# Patient Record
Sex: FEMALE | Race: WHITE
[De-identification: names, ages, dates, MRNs, and addresses within clinical notes are randomized per-mention and may not be internally consistent; named-entity substitution may affect disease eponyms.]

---

## 2020-01-24 ENCOUNTER — HOSPITAL ENCOUNTER (OUTPATIENT)
Dept: HOSPITAL 53 - M SFHCCLAY | Age: 75
End: 2020-01-24
Attending: NURSE PRACTITIONER
Payer: MEDICARE

## 2020-01-24 DIAGNOSIS — Z13.21: ICD-10-CM

## 2020-01-24 DIAGNOSIS — E11.69: Primary | ICD-10-CM

## 2020-01-24 DIAGNOSIS — Z79.899: ICD-10-CM

## 2020-01-24 LAB
25(OH)D3 SERPL-MCNC: 27 NG/ML (ref 30–100)
ALBUMIN SERPL BCG-MCNC: 4.3 GM/DL (ref 3.2–5.2)
ALT SERPL W P-5'-P-CCNC: 21 U/L (ref 12–78)
ANISOCYTOSIS BLD QL SMEAR: (no result)
BASOPHILS NFR BLD MANUAL: 1 % (ref 0–1)
BILIRUB SERPL-MCNC: 0.6 MG/DL (ref 0.2–1)
BUN SERPL-MCNC: 16 MG/DL (ref 7–18)
CALCIUM SERPL-MCNC: 9.4 MG/DL (ref 8.8–10.2)
CHLORIDE SERPL-SCNC: 105 MEQ/L (ref 98–107)
CHOLEST SERPL-MCNC: 230 MG/DL (ref ?–200)
CHOLEST/HDLC SERPL: 4.34 {RATIO} (ref ?–5)
CO2 SERPL-SCNC: 32 MEQ/L (ref 21–32)
CREAT SERPL-MCNC: 0.88 MG/DL (ref 0.55–1.3)
CREAT UR-MCNC: 43.2 MG/DL
EOSINOPHIL NFR BLD MANUAL: 1 % (ref 0–3)
EST. AVERAGE GLUCOSE BLD GHB EST-MCNC: 157 MG/DL (ref 60–110)
GFR SERPL CREATININE-BSD FRML MDRD: > 60 ML/MIN/{1.73_M2} (ref 39–?)
GLUCOSE SERPL-MCNC: 117 MG/DL (ref 70–100)
HCT VFR BLD AUTO: 44.8 % (ref 36–47)
HDLC SERPL-MCNC: 53 MG/DL (ref 40–?)
HGB BLD-MCNC: 14 G/DL (ref 12–15.5)
HYPOCHROMIA BLD QL SMEAR: (no result)
LDLC SERPL CALC-MCNC: 150 MG/DL (ref ?–100)
LYMPHOCYTES NFR BLD MANUAL: 42 % (ref 16–44)
MCH RBC QN AUTO: 29.9 PG (ref 27–33)
MCHC RBC AUTO-ENTMCNC: 31.3 G/DL (ref 32–36.5)
MCV RBC AUTO: 95.7 FL (ref 80–96)
MICROALBUMIN UR-MCNC: 196 MG/L
MICROALBUMIN/CREAT UR: 453.7 MCG/MG (ref 0–30)
MONOCYTES NFR BLD MANUAL: 4 % (ref 0–5)
NEUTROPHILS NFR BLD MANUAL: 44 % (ref 28–66)
NONHDLC SERPL-MCNC: 177 MG/DL
PLATELET # BLD AUTO: 224 10^3/UL (ref 150–450)
PLATELET BLD QL SMEAR: NORMAL
POTASSIUM SERPL-SCNC: 4 MEQ/L (ref 3.5–5.1)
PROT SERPL-MCNC: 7.6 GM/DL (ref 6.4–8.2)
RBC # BLD AUTO: 4.68 10^6/UL (ref 4–5.4)
SODIUM SERPL-SCNC: 142 MEQ/L (ref 136–145)
TRIGL SERPL-MCNC: 136 MG/DL (ref ?–150)
VARIANT LYMPHS NFR BLD MANUAL: 8 % (ref 0–5)
WBC # BLD AUTO: 9.9 10^3/UL (ref 4–10)

## 2020-01-24 PROCEDURE — 83036 HEMOGLOBIN GLYCOSYLATED A1C: CPT

## 2020-01-24 PROCEDURE — 82043 UR ALBUMIN QUANTITATIVE: CPT

## 2020-01-24 PROCEDURE — 82306 VITAMIN D 25 HYDROXY: CPT

## 2020-01-24 PROCEDURE — 80061 LIPID PANEL: CPT

## 2020-01-24 PROCEDURE — 85025 COMPLETE CBC W/AUTO DIFF WBC: CPT

## 2020-01-24 PROCEDURE — 80053 COMPREHEN METABOLIC PANEL: CPT

## 2020-05-15 ENCOUNTER — HOSPITAL ENCOUNTER (OUTPATIENT)
Dept: HOSPITAL 53 - M SFHCCLAY | Age: 75
End: 2020-05-15
Attending: NURSE PRACTITIONER
Payer: MEDICARE

## 2020-05-15 DIAGNOSIS — R41.0: Primary | ICD-10-CM

## 2020-06-14 ENCOUNTER — HOSPITAL ENCOUNTER (INPATIENT)
Dept: HOSPITAL 53 - M ED | Age: 75
LOS: 4 days | Discharge: HOME | DRG: 380 | End: 2020-06-18
Attending: INTERNAL MEDICINE | Admitting: INTERNAL MEDICINE
Payer: MEDICARE

## 2020-06-14 VITALS — WEIGHT: 179.24 LBS | HEIGHT: 66 IN | BODY MASS INDEX: 28.81 KG/M2

## 2020-06-14 VITALS — DIASTOLIC BLOOD PRESSURE: 49 MMHG | SYSTOLIC BLOOD PRESSURE: 102 MMHG

## 2020-06-14 VITALS — DIASTOLIC BLOOD PRESSURE: 47 MMHG | SYSTOLIC BLOOD PRESSURE: 95 MMHG

## 2020-06-14 VITALS — DIASTOLIC BLOOD PRESSURE: 50 MMHG | SYSTOLIC BLOOD PRESSURE: 95 MMHG

## 2020-06-14 VITALS — DIASTOLIC BLOOD PRESSURE: 43 MMHG | SYSTOLIC BLOOD PRESSURE: 87 MMHG

## 2020-06-14 DIAGNOSIS — Z79.899: ICD-10-CM

## 2020-06-14 DIAGNOSIS — E11.40: ICD-10-CM

## 2020-06-14 DIAGNOSIS — I48.91: ICD-10-CM

## 2020-06-14 DIAGNOSIS — I24.8: ICD-10-CM

## 2020-06-14 DIAGNOSIS — N17.9: ICD-10-CM

## 2020-06-14 DIAGNOSIS — K26.5: Primary | ICD-10-CM

## 2020-06-14 DIAGNOSIS — R57.0: ICD-10-CM

## 2020-06-14 DIAGNOSIS — F03.90: ICD-10-CM

## 2020-06-14 DIAGNOSIS — Z87.891: ICD-10-CM

## 2020-06-14 LAB
ALBUMIN SERPL BCG-MCNC: 3.9 GM/DL (ref 3.2–5.2)
ALT SERPL W P-5'-P-CCNC: 20 U/L (ref 12–78)
BASOPHILS # BLD AUTO: 0.1 10^3/UL (ref 0–0.2)
BASOPHILS NFR BLD AUTO: 0.3 % (ref 0–1)
BILIRUB CONJ SERPL-MCNC: 0.1 MG/DL (ref 0–0.2)
BILIRUB SERPL-MCNC: 0.4 MG/DL (ref 0.2–1)
CK MB CFR.DF SERPL CALC: 1.15
CK MB CFR.DF SERPL CALC: 1.59
CK MB SERPL-MCNC: 1.5 NG/ML (ref ?–3.6)
CK MB SERPL-MCNC: 3.3 NG/ML (ref ?–3.6)
CK SERPL-CCNC: 131 U/L (ref 26–192)
CK SERPL-CCNC: 208 U/L (ref 26–192)
EOSINOPHIL # BLD AUTO: 0.2 10^3/UL (ref 0–0.5)
EOSINOPHIL NFR BLD AUTO: 1 % (ref 0–3)
HCT VFR BLD AUTO: 39.9 % (ref 36–47)
HGB BLD-MCNC: 13.4 G/DL (ref 12–15.5)
LIPASE SERPL-CCNC: 155 U/L (ref 73–393)
LYMPHOCYTES # BLD AUTO: 4.6 10^3/UL (ref 1.5–5)
LYMPHOCYTES NFR BLD AUTO: 29.8 % (ref 24–44)
MCH RBC QN AUTO: 31.2 PG (ref 27–33)
MCHC RBC AUTO-ENTMCNC: 33.6 G/DL (ref 32–36.5)
MCV RBC AUTO: 92.8 FL (ref 80–96)
MONOCYTES # BLD AUTO: 0.9 10^3/UL (ref 0–0.8)
MONOCYTES NFR BLD AUTO: 5.9 % (ref 0–5)
NEUTROPHILS # BLD AUTO: 9.7 10^3/UL (ref 1.5–8.5)
NEUTROPHILS NFR BLD AUTO: 62.5 % (ref 36–66)
PLATELET # BLD AUTO: 193 10^3/UL (ref 150–450)
PROT SERPL-MCNC: 7.3 GM/DL (ref 6.4–8.2)
RBC # BLD AUTO: 4.3 10^6/UL (ref 4–5.4)
TROPONIN I SERPL-MCNC: 0.08 NG/ML (ref ?–0.1)
TROPONIN I SERPL-MCNC: 0.26 NG/ML (ref ?–0.1)
TROPONIN I SERPL-MCNC: < 0.02 NG/ML (ref ?–0.1)
WBC # BLD AUTO: 15.6 10^3/UL (ref 4–10)

## 2020-06-14 RX ADMIN — SODIUM CHLORIDE ONE MLS/HR: 9 INJECTION, SOLUTION INTRAVENOUS at 21:04

## 2020-06-14 RX ADMIN — SODIUM CHLORIDE ONE MLS/HR: 9 INJECTION, SOLUTION INTRAVENOUS at 21:03

## 2020-06-14 NOTE — REP
Four views abdomen and chest:  06/14/2020.

 

Indication:  Abdominal pain.

 

Findings:

 

The lungs are clear.  There is no free intraperitoneal air.  Contrast is noted

within the urinary bladder.  Multilevel degenerative sequelae of the

thoracolumbar spine are present.  There is no evidence of bowel obstruction.  No

organomegaly is detected.

 

Impression:

 

No bowel obstruction or additional acute process detected.

 

 

Electronically Signed by

Mingo Fischer DO 06/14/2020 03:10 P

## 2020-06-14 NOTE — HPEPDOC
[facility] Medical History & Physical


Date of Admission


2020


Date of Service:  2020


Primary Care Physician:  Sharona Kathleen


Attending Physician:  YESIKA MARCOS MD





History and Physical


TIME OF SERVICE: 9:50 PM


   


CHIEF COMPLAINT: Abdominal pain





HISTORY OF PRESENT ILLNESS: 


This patient is hard of hearing and has dementia, therefore, the history was 

limited.


This is a 75-year-old female who presented with complaints of lower abdominal 

pain that began yesterday and has now resolved. She also had multiple episodes 

of vomiting. Her last bowel movement was 4 days ago. She provided tangential 

answers when asked about fever, chills or any urinary symptoms.  


Per discussion with Fina Wright, the patient was febrile and found to have 

new onset atrial fibrillation on the second EKG. She contacted Dr. Joyce, who 

recommended giving 1 dose of amiodarone along with digoxin which the patient 

received in the ER. He also recommended continuing with digoxin daily; if she 

had an additional episode of rapid afib with a HR above 100 she can receive an 

extra dose of digoxin 0.25mg. He also recommended starting the patient on 

Elquis.





REVIEW OF SYSTEMS: Unable to complete because the patient had difficulties 

hearing me





PAST MEDICAL/ SURGICAL HISTORY:


Chronic hypertension


Diabetes with neuropathy


Dementia


Impaired hearing


Cataract surgery


Nephrolithiasis


 4


Bilateral cataract surgery





SOCIAL HISTORY:


Former smoker





FAMILY HISTORY:


Diabetes.


Colon cancer


Stroke


   


ALLERGIES: Please see below.





HOME MEDICATIONS: Please see below. 





PHYSICAL EXAMINATION:


VITAL SIGNS:  Please see below.


GEN: well-nourished / well developed/ NAD


HEENT: NCAT / mucus membranes moist and pink 


CVS: RRR/NMRG/ radial pulses intact / trace lower extremity edema


LUNGS:  able to speak full sentences without stopping to take a breath /  lungs 

are clear to auscultation bilaterally on room air


ABDOMEN:  Contour ( distended) /  soft & not tender with palpation


MSK/EXTREMITIES: range of motion intact in all 4 extremities


PSYCH: alert and oriented / able to understand and follow all commands





LABORATORY DATA: 








Immature Granulocyte % (Auto) 0.5, Neutrophils (%) (Auto) 62.5, Lymphocytes (%) 

(Auto) 29.8, Monocytes (%) (Auto) 5.9H, Eosinophils (%) (Auto) 1.0, Basophils 

(%) (Auto) 0.3, Neutrophils # (Auto) 9.7H, Lymphocytes # (Auto) 4.6, Monocytes #

(Auto) 0.9H, Eosinophils # (Auto) 0.2, Basophils # (Auto) 0.1, Nucleated Red 

Blood Cells % (auto) 0.0, Total Bilirubin 0.4, Direct Bilirubin 0.1, Aspartate 

Amino Transf (AST/SGOT) 19, Alanine Aminotransferase (ALT/SGPT) 20, Alkaline 

Phosphatase 78, Total Creatine Kinase 208H, Creatine Kinase MB 3.3, Creatine 

Kinase MB Relative Index 1.59, Troponin I < 0.02, Total Protein 7.3, Albumin 

3.9, Albumin/Globulin Ratio 1.1L, Lipase 155


20 12:33: 


POC Glucose (Misc Panel) 130H, POC Sodium (Misc Panel) 144, POC Potassium (Misc 

Panel) 4.5, POC Chloride (Misc Panel) 105, POC Total CO2 (Misc Panel) 25.0, POC 

Blood Urea Nitrogen (Misc Panel 28H, POC Ionized Calcium (Misc Panel) 4.5, POC 

Creatinine (Misc Panel) 0.9, POC Hematocrit (Misc Panel) 41.0


20 13:31: Lactic Acid Level 1.5


20 14:44: 


Urine Color STRAW, Urine Appearance CLEAR, Urine pH 6.0, Urine Specific Gravity 

1.031, Urine Protein NEGATIVE, Urine Glucose (UA) NEGATIVE, Urine Ketones 

NEGATIVE, Urine Blood 1+H, Urine Nitrite NEGATIVE, Urine Bilirubin NEGATIVE, 

Urine Urobilinogen 0.2, Urine Leukocyte Esterase NEGATIVE, Urine WBC (Auto) 1, 

Urine RBC (Auto) 0, Urine Hyaline Casts (Auto) 0, Urine Bacteria (Auto) 

NEGATIVE, Urine Squamous Epithelial Cells 0, Urine Sperm (Auto) 


20 20:11: 


Total Creatine Kinase 131, Creatine Kinase MB 1.5, Creatine Kinase MB Relative 

Index 1.15, Troponin I 0.08#


20 20:12: Lactic Acid Level 1.4


20 22:17: Troponin I 0.26#H





IMAGING: 


CTA chest " Impression: No dissection or vessel occlusion.  No intraluminal 

clot."


CT abdomen and pelvis " Impression: No dissection or vessel occlusion.  No 

intraluminal clot."


X-ray of the abdomen "Impression: No bowel obstruction or additional acute 

process detected."





MICROBIOLOGY: 


20 Respiratory Virus Panel (PCR) (SHANIA) - Final, Complete


          


20 Blood Culture, Received


          Pending


20 Blood Culture, Received


          Pending





ASSESSMENT:


Ms. Bearden is a 75-year-old female with hypertension, diabetes, dementia, dif

ficulties hearing is admitted for evaluation of SIRS and new onset A. fib.





PLAN:


1. New onset Atrial Fibrillation 


Cause TBD


CHADS VASC Score to determine risk of stroke = 5 points 


Plan:  admit to ICU / telemetry / c/w digoxin 1.25mg PO daily / Eliquis / f/u 

serial trops, TSH, Mag & Echo 





2. Elevated Trop


Possibly due to demand ischemia / tachycardia vs NSTEMI


The first EKG showed normal sinus rhythm with a heart rate of 67, the second EKG

showed A. fib with a heart rate of 120, the 30 EKG showed normal sinus rhythm 

with a heart rate of 82 and supraventricular complexes


Plan: telemetry f/u serial trops, serial EKGs, lipid panel, A1C 





3. Hypotension 


She has a hx of HTN 


Plan: IVF / hold lisinopril





4. SIRS 


Possibly due to occult infection


SIRS criteria include


Temp >101 8 / HR >90 / WBC >12 /  RR >20


Lactic acid wnl


Respiratory panel, Chest x-ray, CT of the abdomen and UA are unrevealing


Plan:   telemetry / acetaminophen / f/u Blood Cx 





5.Lower Abdominal Pain 


Cause TBD


Has resolved. 


CT of the abdomen, UA, LFTs and lipase were unrevealing


Plan:  miralax for constipation


6. NIDDM 


Plan:  diabetic diet / f/u accuchecks & A1C / hypoglycemia protocol / sliding 

scale insulin / hold oral anti-glycemics





DVT PROPHYLAXIS: n/a on Eliquis





DISPOSITION: likely home after more than 2 midnight's stay





Home Medications


Scheduled


Lisinopril (Lisinopril) 10 Mg Tablet, 10 MG PO DAILY


Metformin HCl (Metformin HCl) 500 Mg Tablet, 500 MG PO DAILY





Allergies


Coded Allergies:  


     No Known Allergies (Verified  Allergy, Unknown, 20)











YESIKA MARCOS MD                2020 23:45

## 2020-06-14 NOTE — ECGEPIP
[facility] - ED

                                       

                                       Test Date:    2020

Pat Name:     LISBETH BUCHANAN               Department:   

Patient ID:   [id number]                 Room:         -

Gender:       Female                   Technician:   

:          1945               Requested By: JESUS OLEA

Order Number: [id number]     Reading MD:   Katherine Bowser

                                 Measurements

Intervals                              Axis          

Rate:         67                       P:            73

AK:           167                      QRS:          -40

QRSD:         94                       T:            34

QT:           374                                    

QTc:          397                                    

                           Interpretive Statements

SINUS RHYTHM WITH MARKED SINUS ARRHYTHMIA

MARKED LEFT AXIS DEVIATION

SEPTAL MYOCARDIAL INFARCTION, OF INDETERMINATE AGE

NSTTW abnormalities

NO PRIOR

Electronically Signed on 2020 16:12:34 EDT by Katherine Bowser

## 2020-06-14 NOTE — REP
CTA chest, abdomen and pelvis:

 

Indication:  Chest, abdomen and pelvic pain radiating cephalad.

 

Technique:  Axial images of the chest, abdomen and pelvis were performed

following administration of IV iodinated contrast with coronal and sagittal

reconstructions provided.

 

Comparison:  None.

 

Findings:

 

There is no evidence of vessel dissection, significant stenosis or occlusion.

Atherosclerotic disease is noted throughout the aorta and iliac vessels as well

as the renal arteries.  No abnormal fluid collections are present.  There is no

evidence of lymphadenopathy.  The lungs are clear.  There is no pneumothorax.

There is no bowel obstruction.  No focal abnormalities of the spleen, liver,

gallbladder, pancreas or kidneys are noted.

 

Impression:

 

No dissection or vessel occlusion.  No intraluminal clot.

 

 

Electronically Signed by

Mingo Fischer DO 06/14/2020 01:37 P

## 2020-06-15 VITALS — SYSTOLIC BLOOD PRESSURE: 94 MMHG | DIASTOLIC BLOOD PRESSURE: 50 MMHG

## 2020-06-15 VITALS — DIASTOLIC BLOOD PRESSURE: 70 MMHG | SYSTOLIC BLOOD PRESSURE: 155 MMHG

## 2020-06-15 VITALS — SYSTOLIC BLOOD PRESSURE: 108 MMHG | DIASTOLIC BLOOD PRESSURE: 55 MMHG

## 2020-06-15 VITALS — DIASTOLIC BLOOD PRESSURE: 46 MMHG | SYSTOLIC BLOOD PRESSURE: 92 MMHG

## 2020-06-15 VITALS — DIASTOLIC BLOOD PRESSURE: 60 MMHG | SYSTOLIC BLOOD PRESSURE: 99 MMHG

## 2020-06-15 VITALS — SYSTOLIC BLOOD PRESSURE: 96 MMHG | DIASTOLIC BLOOD PRESSURE: 52 MMHG

## 2020-06-15 VITALS — SYSTOLIC BLOOD PRESSURE: 143 MMHG | DIASTOLIC BLOOD PRESSURE: 59 MMHG

## 2020-06-15 VITALS — DIASTOLIC BLOOD PRESSURE: 52 MMHG | SYSTOLIC BLOOD PRESSURE: 100 MMHG

## 2020-06-15 VITALS — SYSTOLIC BLOOD PRESSURE: 103 MMHG | DIASTOLIC BLOOD PRESSURE: 52 MMHG

## 2020-06-15 VITALS — DIASTOLIC BLOOD PRESSURE: 53 MMHG | SYSTOLIC BLOOD PRESSURE: 113 MMHG

## 2020-06-15 VITALS — SYSTOLIC BLOOD PRESSURE: 152 MMHG | DIASTOLIC BLOOD PRESSURE: 68 MMHG

## 2020-06-15 VITALS — DIASTOLIC BLOOD PRESSURE: 57 MMHG | SYSTOLIC BLOOD PRESSURE: 122 MMHG

## 2020-06-15 VITALS — SYSTOLIC BLOOD PRESSURE: 95 MMHG | DIASTOLIC BLOOD PRESSURE: 51 MMHG

## 2020-06-15 VITALS — SYSTOLIC BLOOD PRESSURE: 111 MMHG | DIASTOLIC BLOOD PRESSURE: 51 MMHG

## 2020-06-15 VITALS — SYSTOLIC BLOOD PRESSURE: 101 MMHG | DIASTOLIC BLOOD PRESSURE: 52 MMHG

## 2020-06-15 LAB
ALBUMIN SERPL BCG-MCNC: 3 GM/DL (ref 3.2–5.2)
ALT SERPL W P-5'-P-CCNC: 18 U/L (ref 12–78)
BILIRUB CONJ SERPL-MCNC: 0.2 MG/DL (ref 0–0.2)
BILIRUB SERPL-MCNC: 0.6 MG/DL (ref 0.2–1)
BUN SERPL-MCNC: 32 MG/DL (ref 7–18)
CALCIUM SERPL-MCNC: 7.9 MG/DL (ref 8.8–10.2)
CHLORIDE SERPL-SCNC: 111 MEQ/L (ref 98–107)
CHOLEST SERPL-MCNC: 192 MG/DL (ref ?–200)
CHOLEST/HDLC SERPL: 3.25 {RATIO} (ref ?–5)
CO2 SERPL-SCNC: 26 MEQ/L (ref 21–32)
CREAT SERPL-MCNC: 1.35 MG/DL (ref 0.55–1.3)
EST. AVERAGE GLUCOSE BLD GHB EST-MCNC: 143 MG/DL (ref 60–110)
GFR SERPL CREATININE-BSD FRML MDRD: 40.5 ML/MIN/{1.73_M2} (ref 39–?)
GLUCOSE SERPL-MCNC: 140 MG/DL (ref 70–100)
HCT VFR BLD AUTO: 34.7 % (ref 36–47)
HDLC SERPL-MCNC: 59 MG/DL (ref 40–?)
HGB BLD-MCNC: 11.5 G/DL (ref 12–15.5)
LDLC SERPL CALC-MCNC: 124 MG/DL (ref ?–100)
MAGNESIUM SERPL-MCNC: 2.3 MG/DL (ref 1.8–2.4)
MAGNESIUM SERPL-MCNC: 2.4 MG/DL (ref 1.8–2.4)
MCH RBC QN AUTO: 31.1 PG (ref 27–33)
MCHC RBC AUTO-ENTMCNC: 33.1 G/DL (ref 32–36.5)
MCV RBC AUTO: 93.8 FL (ref 80–96)
NONHDLC SERPL-MCNC: 133 MG/DL
PLATELET # BLD AUTO: 147 10^3/UL (ref 150–450)
POTASSIUM SERPL-SCNC: 4.1 MEQ/L (ref 3.5–5.1)
POTASSIUM SERPL-SCNC: 4.3 MEQ/L (ref 3.5–5.1)
PROT SERPL-MCNC: 6.3 GM/DL (ref 6.4–8.2)
RBC # BLD AUTO: 3.7 10^6/UL (ref 4–5.4)
SODIUM SERPL-SCNC: 145 MEQ/L (ref 136–145)
TRIGL SERPL-MCNC: 47 MG/DL (ref ?–150)
TROPONIN I SERPL-MCNC: 1.1 NG/ML (ref ?–0.1)
TSH SERPL DL<=0.005 MIU/L-ACNC: 0.68 UIU/ML (ref 0.36–3.74)
WBC # BLD AUTO: 22.1 10^3/UL (ref 4–10)

## 2020-06-15 RX ADMIN — ACETAMINOPHEN PRN MG: 325 TABLET ORAL at 17:55

## 2020-06-15 RX ADMIN — POLYETHYLENE GLYCOL 3350 SCH PKT: 17 POWDER, FOR SOLUTION ORAL at 08:00

## 2020-06-15 RX ADMIN — INSULIN LISPRO SCH UNITS: 100 INJECTION, SOLUTION INTRAVENOUS; SUBCUTANEOUS at 17:53

## 2020-06-15 RX ADMIN — DIGOXIN SCH MG: 125 TABLET ORAL at 08:00

## 2020-06-15 RX ADMIN — INSULIN LISPRO SCH UNITS: 100 INJECTION, SOLUTION INTRAVENOUS; SUBCUTANEOUS at 11:39

## 2020-06-15 RX ADMIN — INSULIN LISPRO SCH UNITS: 100 INJECTION, SOLUTION INTRAVENOUS; SUBCUTANEOUS at 08:00

## 2020-06-15 RX ADMIN — POLYETHYLENE GLYCOL 3350 SCH PKT: 17 POWDER, FOR SOLUTION ORAL at 20:42

## 2020-06-15 RX ADMIN — ACETAMINOPHEN PRN MG: 325 TABLET ORAL at 13:15

## 2020-06-15 RX ADMIN — APIXABAN SCH MG: 5 TABLET, FILM COATED ORAL at 08:00

## 2020-06-15 RX ADMIN — MORPHINE SULFATE PRN MG: 2 INJECTION, SOLUTION INTRAMUSCULAR; INTRAVENOUS at 17:54

## 2020-06-15 RX ADMIN — INSULIN LISPRO SCH UNITS: 100 INJECTION, SOLUTION INTRAVENOUS; SUBCUTANEOUS at 20:41

## 2020-06-15 RX ADMIN — APIXABAN SCH MG: 5 TABLET, FILM COATED ORAL at 20:41

## 2020-06-15 NOTE — ECGEPIP
[facility] - ED

                                       

                                       Test Date:    2020

Pat Name:     LISBETH BUCHANAN               Department:   

Patient ID:   [id number]                 Room:         [place] [address]

Gender:       Female                   Technician:   kevan

:          1945               Requested By: KENNETH AMADO PA-C

Order Number: [id number]     Reading MD:   Torin Dyer

                                 Measurements

Intervals                              Axis          

Rate:         82                       P:            79

GA:           160                      QRS:          -34

QRSD:         94                       T:            159

QT:           356                                    

QTc:          418                                    

                           Interpretive Statements

SINUS RHYTHM WITH OCCASIONAL SUPRAVENTRICULAR PREMATURE COMPLEXES

LEFT AXIS DEVIATION

POOR R WAVE PROGRESSION

ST DEVIATION AND MODERATE T-WAVE ABNORMALITY, CONSIDER LATERAL ISCHEMIA

SIMILAR TO PRIOR ON SAME DATE

Electronically Signed on 6- 21:27:30 EDT by Torin Dyer

## 2020-06-15 NOTE — ECHO
DATE OF PROCEDURE:  06/15/2020

 

REFERRING PHYSICIAN:  Dr. Audrey Navarro

 

INDICATION:  Dyspnea.

 

Height 168 cm, weight 79 kg.

 

DIMENSIONS:

IVS:  1.4

LV:  3.7

LVPW:  1.3

LA:  3.5

Aorta:  3.1

IVC:  1.6

Mitral E wave velocity:  83

A wave:  87

E prime septal:  4.1

E prime lateral:  5.7

 

FINDINGS:

The study is of rather limited quality.  It was a sitting exam with 
uncooperative

patient. The patient is in sinus rhythm.

 

Left ventricle has normal size and normal systolic function, I estimate ejection

fraction (EF) around 65-70%.  Mild left ventricular hypertrophy is present.

Right ventricle is normal size and systolic function.  Both atria appear at 
least

mildly enlarged.  Aortic valve is sclerotic.  There appears some minimal

restriction of cusp mobility, but visualization was poor.  There are also

degenerative abnormalities of mitral valve with mitral annular calcifications 
but

mobility of leaflet seems preserved.  Tricuspid valve appears normal.  Pulmonic

valve also appears normal.  No pericardial effusion is noted.  Inferior vena 
cava

is normal size and appropriately collapses with inspiration indicative of likely

normal central venous pressure.  Aortic root is normal.  Aortic arch and

abdominal aorta were not well seen.

 

Doppler interrogation of aortic valve reveals no significant insufficiency and

fairly trivial stenosis with mean gradient 6 mmHg.  There is competent mitral

valve without stenosis or insufficiency, trace tricuspid insufficiency and trace

pulmonic insufficiency.  Calculated pulmonary artery pressure is in 20s

corresponding to normal values.

 

Mitral inflow pattern and tissue Doppler imaging of mitral annulus revealed 
grade

1 diastolic dysfunction.

 

CONCLUSIONS:

1.  Study is of fair technical quality, the patient is in sinus rhythm.

2.  Normal left ventricular (LV) size with mild left ventricular hypertrophy

(LVH),  preserved LV systolic function and grade 1 diastolic dysfunction.

3.  Aortic sclerosis but without significant insufficiency and only trivial

stenosis.

4.  Degenerative abnormalities of mitral valve but without functional

consequence.

5.  Trace tricuspid and pulmonic insufficiency.

6.  Likely normal central venous pressure and normal pulmonary artery pressure.

 

COMMENT:  Subacute bacterial endocarditis (SBE) prophylaxis is not recommended.

The study is most consistent with mild form of hypertensive heart disease but

does not provide obvious explanation for etiology of dyspnea.

[facility]

## 2020-06-15 NOTE — IPNPDOC
Date Seen


The patient was seen on 6/15/20.





Progress Note


SUBJECTIVE: Patient was seen and examined this morning. She was admitted last 

night for atrial fibrillation with rapid ventricular response. According to the 

patient she had developed abdominal pain as well as some nausea and vomiting. On

presentation she was noted to be in atrial fibrillation with rapid ventricular 

response. She was transiently hypotensive. She has received IV amiodarone and 

digoxin. This morning she states that she feels better compared to yesterday. 

She continues to have right upper quadrant abdominal pain however states that 

the pain has improved. She denies any palpitations or chest pain. She denies any

shortness of breath





OBJECTIVE


PHYSICAL EXAMINATION:


VITAL SIGNS: Please see below. 


GENERAL: Awake, alert, and oriented. Does not appear to be in any acute distre

ss. Lying in bed comfortably 


HEENT: Atraumatic, normocephalic. Eyes are nonicteric. Trachea is midline 


CARDIOVASCULAR: Normal S1, S2. Regular rate and rhythm. No clicks, rubs, or 

murmurs 


RESPIRATORY: Clear vesicular breath sounds bilaterally. Bibasilar crackles. Good

respiratory effort. No wheezes, rhonchi, or rales 


ABDOMINAL: Soft, nondistended. Mild tenderness in right upper quadrant. No leslye

ound tenderness or guarding. Normoactive bowel sounds throughout


EXTREMITIES: No edema. Full and equal pulses in bilateral upper and lower 

extremities 


NEUROLOGICAL: No focal neurological deficits 


PSYCHOLOGICAL: Mood and affect appear appropriate 





LABORATORY DATA, IMAGING STUDIES, MICROBIOLOGY: Please see below.





Echocardiogram: Pending 





DVT prophylaxis ordered?: Eliquis





ASSESSMENT AND PLAN: Patient is a 75 year old female who presented to the [facility] ER

with complaint of abdominal pain and found to be in A-fib with RVR and 

transiently hypotensive. She has since received amiodarone and digoxin and 

converted to sinus rhythm 





PROBLEMS:


1. New Onset Atrial Fibrillation with RVR 


   -Patient presented with atrial fibrillation in RVR. She was given Amiodarone 

and Digoxin and has since converted to sinus rhythm. The exact cause of her 

atrial fibrillation is unknown at this time. She does have a leukocytosis 

however this is likely reactive to her hypotensive episode and not infectious. 

CT abdomen and chest negative for any acute pathology. She has been afebrile. 


   -Patient had elevated troponins likely secondary to increased demand from a-

fib rvr and decreased Cardiac output. Troponins have plateaued 


   -Will continue Digoxin. 


   -Echocardiogram is pending 


   -Continue Eliquis 





2. Hypotension/shock


   -Patient was hypotensive yesterday with MAPS in the mid 50's. Likely 

secondary to decreased cardiac output from her atrial fibrillation. She has 

since been normotensive. 





3. Abdominal Pain 


   -Patients original presenting complaint was abdominal pain. She has received 

CT angiography of her chest and abdomen which did not demonstrate any acute 

occlusion or signs of ischemia. Her lactic acid on presentation was 1.5. She was

hypotensive and this may have led to some transient bowel ischemia. 

Additionally, she was converted to sinus rhythm on presentation. There is a 

possibility of embolization from to bowel. 


   -Will repeat Lactic acid to ensure no elevation in Lactic acid since prese

ntation 


   -Repeat Liver profile to ensure no signs of shock liver 


   -Pain Management. 


   -Will order gallbladder ultrasound for AM. NPO after midnight for test 


   -Will start empiric Rocephin.


   -Procalcitonin, CRP ordered 





3. Acute Kidney Injury 


   -Patients Cr this morning is 1.35. Her baseline appears to be 0.8 - 1.0. 


   -GALLO is likely secondary to hypotension and decreased renal perfusion. 

Patient is tolerating PO. 


   -Will trend Cr 


   -Avoid Nephrotoxic agents. Lisinopril is held 





4. Leukocytosis 


   -WBC of 22 this morning. Patient does not have a source of infection. Imaging

has been negative. Urine negative. She is afebrile. Her only symptom is right 

upper quadrant pain. Will continue to trend. 


   -Will start empiric Rocephin


   -Procalcitonin and CRP ordered 





5. Elevated Troponin


   -As stated above, patient had elevated troponin. This was likely 2/2 to 

demand ischemia from decreased cardiac output from atrial fibrillation with rvr.

Troponin levels have plateaued 





DVT Prophylaxis 


   -Eliquis 





DISPOSITION: Down grade to PCU status today. Discharge is pending clinical 

improvement. Likely 24-48 hours 





As preceptor for this patient I was fully available. All aspects of the patient 

interview, examination, medical decision making process, and medical care plan 

development were reviewed and approved. Aware and concur with the plan as stated

in the body of this note and will attest to such by my cosignature.





VS, I&O, 24H, Fishbone


Vital Signs/I&O





Vital Signs








  Date Time  Temp Pulse Resp B/P (MAP) Pulse Ox O2 Delivery O2 Flow Rate FiO2


 


6/15/20 08:00 97.9 66 18 113/53 (73) 98 Room Air  


 


6/15/20 04:00       2.0 














I&O- Last 24 Hours up to 6 AM 


 


 6/15/20





 05:59


 


Intake Total 1990 ml


 


Output Total 160 ml


 


Balance 1830 ml











Laboratory Data


24H LABS


Laboratory Tests 2


6/14/20 13:31: Lactic Acid Level 1.5


6/14/20 14:44: 


Urine Color STRAW, Urine Appearance CLEAR, Urine pH 6.0, Urine Specific Gravity 

1.031, Urine Protein NEGATIVE, Urine Glucose (UA) NEGATIVE, Urine Ketones 

NEGATIVE, Urine Blood 1+H, Urine Nitrite NEGATIVE, Urine Bilirubin NEGATIVE, 

Urine Urobilinogen 0.2, Urine Leukocyte Esterase NEGATIVE, Urine WBC (Auto) 1, 

Urine RBC (Auto) 0, Urine Hyaline Casts (Auto) 0, Urine Bacteria (Auto) 

NEGATIVE, Urine Squamous Epithelial Cells 0, Urine Sperm (Auto) 


6/14/20 20:11: 


Total Creatine Kinase 131, Creatine Kinase MB 1.5, Creatine Kinase MB Relative 

Index 1.15, Troponin I 0.08#


6/14/20 20:12: Lactic Acid Level 1.4


6/14/20 22:17: 


Magnesium Level 2.3, Troponin I 0.26#H


6/15/20 04:20: 


Magnesium Level 2.4, Troponin I 1.23#H, Nucleated Red Blood Cells % (auto) 0.0, 

Anion Gap 8, Glomerular Filtration Rate 40.5, Calcium Level 7.9L, Triglycerides 

Level 47, Total Cholesterol 192, LDL Cholesterol 124H, Non-HDL Cholesterol (LDL 

+ VLDL) 133, Total HDL Cholesterol 59, Cholesterol/HDL Ratio 3.254, Thyroid St

imulating Hormone (TSH) 0.683


6/15/20 07:47: Bedside Glucose (Misc Panel) 119H


6/15/20 09:54: Troponin I 1.10H


6/15/20 11:34: Bedside Glucose (Misc Panel) 131H


CBC/BMP


Laboratory Tests


6/14/20 22:17








6/15/20 04:20








Microbiology





Microbiology


6/14/20 Respiratory Virus Panel (PCR) (SHANIA) - Final, Complete


          


6/14/20 Blood Culture, Received


          Pending


6/14/20 Blood Culture, Received


          Pending











MARGARITO UMANA DO            Rigo 15, 2020 13:17


SOM ELIAS MD              Jun 22, 2020 12:24

## 2020-06-15 NOTE — ECGEPIP
[facility]

                                       

                                       Test Date:    2020-06-15

Pat Name:     LISBETH BUCHANAN               Department:   

Patient ID:   [id number]                 Room:         [place] [address]

Gender:       Female                   Technician:   TRAVIS

:          1945               Requested By: YESIKA MARCOS 

Order Number: [id number]     Reading MD:   Clement Maldonado

                                 Measurements

Intervals                              Axis          

Rate:         74                       P:            75

CO:           173                      QRS:          -27

QRSD:         96                       T:            31

QT:           364                                    

QTc:          405                                    

                           Interpretive Statements

Normal sinus rhythm

PACs

Left axis deviation

Nonspecific T wave abnormality

No significant change when compared to prior tracing of 2020

Electronically Signed on 6- 21:14:13 EDT by Clement Maldonado

## 2020-06-15 NOTE — ECGEPIP
[facility] - ED

                                       

                                       Test Date:    2020

Pat Name:     LISBETH BUCHANAN               Department:   

Patient ID:   [id number]                 Room:         [place] [address]

Gender:       Female                   Technician:   alesha

:          1945               Requested By: AALIYAH LAZAR

Order Number: [id number]     Reading MD:   Torin Dyer

                                 Measurements

Intervals                              Axis          

Rate:         120                      P:            

OH:           0                        QRS:          -30

QRSD:         86                       T:            193

QT:           285                                    

QTc:          403                                    

                           Interpretive Statements

ATRIAL FIBRILLATION WITH RAPID VENTRICULAR RESPONSE

SEPTAL MYOCARDIAL INFARCTION, PROBABLY OLD

ST DEVIATION AND MODERATE T-WAVE ABNORMALITY, CONSIDER LATERAL ISCHEMIA

RHYTHM/RATE CHANGE COMPARED TO PRIOR ON SAME DATE

Electronically Signed on 6- 21:25:09 EDT by Torin Dyer

## 2020-06-16 VITALS — SYSTOLIC BLOOD PRESSURE: 119 MMHG | DIASTOLIC BLOOD PRESSURE: 58 MMHG

## 2020-06-16 VITALS — SYSTOLIC BLOOD PRESSURE: 135 MMHG | DIASTOLIC BLOOD PRESSURE: 63 MMHG

## 2020-06-16 VITALS — DIASTOLIC BLOOD PRESSURE: 68 MMHG | SYSTOLIC BLOOD PRESSURE: 152 MMHG

## 2020-06-16 VITALS — SYSTOLIC BLOOD PRESSURE: 153 MMHG | DIASTOLIC BLOOD PRESSURE: 68 MMHG

## 2020-06-16 VITALS — DIASTOLIC BLOOD PRESSURE: 63 MMHG | SYSTOLIC BLOOD PRESSURE: 139 MMHG

## 2020-06-16 VITALS — DIASTOLIC BLOOD PRESSURE: 71 MMHG | SYSTOLIC BLOOD PRESSURE: 156 MMHG

## 2020-06-16 VITALS — DIASTOLIC BLOOD PRESSURE: 63 MMHG | SYSTOLIC BLOOD PRESSURE: 137 MMHG

## 2020-06-16 LAB
ALBUMIN SERPL BCG-MCNC: 2.8 GM/DL (ref 3.2–5.2)
ALT SERPL W P-5'-P-CCNC: 31 U/L (ref 12–78)
BASOPHILS # BLD AUTO: 0.1 10^3/UL (ref 0–0.2)
BASOPHILS NFR BLD AUTO: 0.3 % (ref 0–1)
BILIRUB SERPL-MCNC: 0.6 MG/DL (ref 0.2–1)
BUN SERPL-MCNC: 31 MG/DL (ref 7–18)
CALCIUM SERPL-MCNC: 8.2 MG/DL (ref 8.8–10.2)
CHLORIDE SERPL-SCNC: 107 MEQ/L (ref 98–107)
CO2 SERPL-SCNC: 30 MEQ/L (ref 21–32)
CREAT SERPL-MCNC: 1.02 MG/DL (ref 0.55–1.3)
CRP SERPL-MCNC: 17.1 MG/DL (ref 0–0.3)
EOSINOPHIL # BLD AUTO: 0.1 10^3/UL (ref 0–0.5)
EOSINOPHIL NFR BLD AUTO: 0.7 % (ref 0–3)
GFR SERPL CREATININE-BSD FRML MDRD: 55.9 ML/MIN/{1.73_M2} (ref 39–?)
GLUCOSE SERPL-MCNC: 117 MG/DL (ref 70–100)
HCT VFR BLD AUTO: 33.9 % (ref 36–47)
HGB BLD-MCNC: 11.2 G/DL (ref 12–15.5)
LYMPHOCYTES # BLD AUTO: 3.3 10^3/UL (ref 1.5–5)
LYMPHOCYTES NFR BLD AUTO: 19 % (ref 24–44)
MCH RBC QN AUTO: 31.4 PG (ref 27–33)
MCHC RBC AUTO-ENTMCNC: 33 G/DL (ref 32–36.5)
MCV RBC AUTO: 95 FL (ref 80–96)
MONOCYTES # BLD AUTO: 0.7 10^3/UL (ref 0–0.8)
MONOCYTES NFR BLD AUTO: 4 % (ref 0–5)
NEUTROPHILS # BLD AUTO: 13 10^3/UL (ref 1.5–8.5)
NEUTROPHILS NFR BLD AUTO: 75.4 % (ref 36–66)
PLATELET # BLD AUTO: 127 10^3/UL (ref 150–450)
POTASSIUM SERPL-SCNC: 4.3 MEQ/L (ref 3.5–5.1)
PROT SERPL-MCNC: 5.9 GM/DL (ref 6.4–8.2)
RBC # BLD AUTO: 3.57 10^6/UL (ref 4–5.4)
SODIUM SERPL-SCNC: 138 MEQ/L (ref 136–145)
WBC # BLD AUTO: 17.3 10^3/UL (ref 4–10)

## 2020-06-16 RX ADMIN — POLYETHYLENE GLYCOL 3350 SCH PKT: 17 POWDER, FOR SOLUTION ORAL at 20:26

## 2020-06-16 RX ADMIN — INSULIN LISPRO SCH UNITS: 100 INJECTION, SOLUTION INTRAVENOUS; SUBCUTANEOUS at 11:53

## 2020-06-16 RX ADMIN — ACETAMINOPHEN PRN MG: 325 TABLET ORAL at 09:31

## 2020-06-16 RX ADMIN — SODIUM CHLORIDE SCH MLS/HR: 9 INJECTION, SOLUTION INTRAVENOUS at 09:57

## 2020-06-16 RX ADMIN — DEXTROSE MONOHYDRATE SCH MLS/HR: 5 INJECTION INTRAVENOUS at 16:23

## 2020-06-16 RX ADMIN — MORPHINE SULFATE PRN MG: 2 INJECTION, SOLUTION INTRAMUSCULAR; INTRAVENOUS at 09:32

## 2020-06-16 RX ADMIN — DEXTROSE MONOHYDRATE SCH MLS/HR: 5 INJECTION INTRAVENOUS at 22:14

## 2020-06-16 RX ADMIN — APIXABAN SCH MG: 5 TABLET, FILM COATED ORAL at 09:29

## 2020-06-16 RX ADMIN — POLYETHYLENE GLYCOL 3350 SCH PKT: 17 POWDER, FOR SOLUTION ORAL at 09:30

## 2020-06-16 RX ADMIN — POLYETHYLENE GLYCOL 3350 SCH PKT: 17 POWDER, FOR SOLUTION ORAL at 20:28

## 2020-06-16 RX ADMIN — DEXTROSE MONOHYDRATE SCH MG: 50 INJECTION, SOLUTION INTRAVENOUS at 12:06

## 2020-06-16 RX ADMIN — DEXTROSE MONOHYDRATE SCH MG: 50 INJECTION, SOLUTION INTRAVENOUS at 20:26

## 2020-06-16 RX ADMIN — INSULIN LISPRO SCH UNITS: 100 INJECTION, SOLUTION INTRAVENOUS; SUBCUTANEOUS at 20:26

## 2020-06-16 RX ADMIN — DIGOXIN SCH MG: 125 TABLET ORAL at 09:30

## 2020-06-16 RX ADMIN — INSULIN LISPRO SCH UNITS: 100 INJECTION, SOLUTION INTRAVENOUS; SUBCUTANEOUS at 07:30

## 2020-06-16 RX ADMIN — INSULIN LISPRO SCH UNITS: 100 INJECTION, SOLUTION INTRAVENOUS; SUBCUTANEOUS at 17:31

## 2020-06-16 RX ADMIN — DEXTROSE MONOHYDRATE SCH MLS/HR: 5 INJECTION INTRAVENOUS at 09:58

## 2020-06-16 NOTE — REP
GASTROGRAFIN UPPER GI

 

The procedure was performed under the direct supervision of Dr. Diallo.  The images

were reviewed with Dr. Diallo.

 

The  film shows no organomegaly or pathological masses.  The intestinal gas

pattern is nonspecific.  There are degenerative changes of the spine.  There are

vascular calcifications identified.

 

The 50 50 solution of Gastrografin and water was administered.

 

The oral and pharyngeal stages of deglutition are unremarkable.  During

esophageal transport there are tertiary waves demonstrated.  There is no evidence

of esophagitis stricture mucosal ring or hiatal hernia.  Gastroesophageal reflux

is not demonstrated on this examination.

 

In the antrum of the stomach there are thickened folds suggesting gastritis.  In

the bulb or and post bulbar duodenum there are thickened folds suggesting

duodenitis.  There is no lisa ulcer identified.  There is a medially directed

diverticulum in the descending portion of the duodenum.  There is no

extravasation identified.

 

Impression:

1.  Tertiary waves.

2.  There are thickened folds in the antrum of the stomach suggesting gastritis.

3.  There are thickened folds in the bulb and post bulbar duodenum suggesting

duodenitis.  There is no lisa ulcer identified.  There is a medially directed

descending duodenal diverticulum.

4.  There is no evidence of extravasation.

 

2.7 minutes of fluoroscopy time was utilized for this procedure.

 

 

Electronically Signed by

JAYLAN Felipe 06/16/2020 01:52 P

Electronically Signed by

Kalyan Diallo MD 06/16/2020 02:04 P

## 2020-06-16 NOTE — REP
CT ABDOMEN AND PELVIS WITHOUT IV OR ORAL CONTRAST:

 

HISTORY:  Abdomen pain.

 

Comparison CT study is from June 14, 2020.

 

CT FINDINGS:  Digital preliminary  radiograph demonstrates an unremarkable

bowel gas pattern.  There are small bilateral pleural effusions, a new finding

compared with the June 14 2020 study.  No pericardial effusion is visible.  Liver

spleen and gallbladder and pancreas are unremarkable.  No adrenal abnormality is

seen.

 

There is an abnormality in the retroperitoneum posterior and medial to the

descending duodenum with some extraluminal gas in this region.  The small fluid

collection in this region on the June 14, 2020 study is no longer apparent.  The

findings suggest localized perforation perhaps of duodenal ulcer.  There is a

small quantity of fluid in Morison's pouch and along the pararenal soft tissues.

This is slightly increased from the prior study.  A non-inflamed appendix is seen

adjacent to this.  There is some scattered diverticulosis of the colon.  There is

no evidence of free intraperitoneal air.  No other fluid collection is seen.

 

IMPRESSION:

 

There is extraluminal gas in a localized portion of the retroperitoneum posterior

and medial to the descending duodenum.  Findings suggest localized perforation of

the duodenum, possibly perforated duodenal ulcer.  Findings are similar to June 14, 2020 except that prior exam showed a small amount of fluid in this region.

The fluid is no longer apparent here.  There is minimal fluid in Morison's pouch

and small bilateral pleural effusions have developed.

 

 

Electronically Signed by

Kalyan Diallo MD 06/16/2020 12:28 P

## 2020-06-16 NOTE — IPNPDOC
Date Seen


The patient was seen on 6/16/20.





Progress Note


SUBJECTIVE: Patient was seen and examined this morning. There were not adverse 

events reported overnight. This morning the patient still complains of abdominal

pain mainly located in her right upper quadrant. Radiology had reported a 

possible microperforation on the patients CT imaging from previously. The 

patient has been made NPO and sent for a CT abdomen/Pelvis which demonstrated 

possible perforated duodenal ulcer 





OBJECTIVE


PHYSICAL EXAMINATION:


VITAL SIGNS: Please see below. 


GENERAL: Awake, alert, and oriented. Hard of hearing. Does not appear to be in 

any acute distress.


HEENT: Atraumatic, normocephalic. Eyes are nonicteric. Trachea is midline. Left 

sided hearing aid in place 


CARDIOVASCULAR: Normal S1, S2. Regular rate and rhythm. No clicks rubs or 

murmurs. 


RESPIRATORY: Clear vesicular breath sounds bilaterally. Slightly diminished 

breath sounds in the bases. Good respiratory effort. No wheezes, rhonchi, or 

rales . 


ABDOMINAL: Soft, nondistended. Tenderness in the right upper quadrant. No 

rebound tenderness or guarding. Normoactive bowel sounds 


EXTREMITIES: No edema. Full and equal pulses in bilateral upper and lower 

extremities 


NEUROLOGICAL: No focal neurological deficits 


PSYCHOLOGICAL: Mood and affect appear appropriate 





LABORATORY DATA, IMAGING STUDIES, MICROBIOLOGY: Please see below.





Echocardiogram: 





DATE OF PROCEDURE:  06/15/2020


 


REFERRING PHYSICIAN:  Dr. Audrey Navarro


 


INDICATION:  Dyspnea.


 


Height 168 cm, weight 79 kg.


 


DIMENSIONS:


IVS:  1.4


LV:  3.7


LVPW:  1.3


LA:  3.5


Aorta:  3.1


IVC:  1.6


Mitral E wave velocity:  83


A wave:  87


E prime septal:  4.1


E prime lateral:  5.7


 


FINDINGS:


The study is of rather limited quality.  It was a sitting exam with 

uncooperative


patient.


The patient is in sinus rhythm.


 


Left ventricle has normal size and normal systolic function, I estimate ejection


fraction (EF) around 65-70%.  Mild left ventricular hypertrophy is present.


Right ventricle is normal size and systolic function.  Both atria appear at 

least


mildly enlarged.  Aortic valve is sclerotic.  There appears some minimal


restriction of cusp mobility, but visualization was poor.  There are also


degenerative abnormalities of mitral valve with mitral annular calcifications 

but


mobility of leaflet seems preserved.  Tricuspid valve appears normal.  Pulmonic


valve also appears normal.  No pericardial effusion is noted.  Inferior vena 

cava


is normal size and appropriately collapses with inspiration indicative of likely


normal central venous pressure.  Aortic root is normal.  Aortic arch and


abdominal aorta were not well seen.


 


Doppler interrogation of aortic valve reveals no significant insufficiency and


fairly trivial stenosis with mean gradient 6 mmHg.  There is competent mitral


valve without stenosis or insufficiency, trace tricuspid insufficiency and trace


pulmonic insufficiency.  Calculated pulmonary artery pressure is in 20s


corresponding to normal values.


 


Mitral inflow pattern and tissue Doppler imaging of mitral annulus revealed 

grade


1 diastolic dysfunction.


 


CONCLUSIONS:


1.  Study is of fair technical quality, the patient is in sinus rhythm.


2.  Normal left ventricular (LV) size with mild left ventricular hypertrophy


(LVH),  preserved LV systolic function and grade 1 diastolic dysfunction.


3.  Aortic sclerosis but without significant insufficiency and only trivial


stenosis.


4.  Degenerative abnormalities of mitral valve but without functional


consequence.


5.  Trace tricuspid and pulmonic insufficiency.


6.  Likely normal central venous pressure and normal pulmonary artery pressure.


 


COMMENT:  Subacute bacterial endocarditis (SBE) prophylaxis is not recommended.


The study is most consistent with mild form of hypertensive heart disease but


does not provide obvious explanation for etiology of dyspnea.


DD:   Stewart Moreno MD  06/15/20 1919


DT:   DAYO  06/15/20 3356  





DVT prophylaxis ordered?: TEDs and Sequentials 





ASSESSMENT AND PLAN: Patient is a 75 year old female who presented to [facility] with a

complaint of abdominal pain and found to be in Atrial fibrillation with RVR. CT 

imaging on presentation was negative for any acute findings. Today CT imaging 

demonstrated possible perforated duodenal ulcer 





PROBLEMS:


1. Abdominal Pain 2/2 Perforated Duodenal Ulcer vs Duodenitis 


   -Patient had abdominal pain on presentation. She had CT imaging of her 

abdomen and pelvis on presentation which did not demonstrate any acute findings 

at the time. This morning there was notification per radiology that the patient 

may have a possible microperforation. CT imaging without contrast was repeated 

demonstrating extraluminal gas in a localized portion of the retroperitoneum 

posterior to the descending duodenum suggesting possible perforation of the 

duodenum possibly perforated duodenal ulcer 


   -General Surgery has been consulted and case was discussed. Assistance is 

appreciated 


   -Upper GI series was obtained. This is demonstrating thickened folds in the 

bulb and post bulbar duodenum suggesting duodenitis. There was no lisa ulcer 

identified. There was no evidence of extravasation. 


   -Patients diet has been advanced to clear liquids


   -Eliquis on hold in light of duodenitis/ulcer 


   -Continue IV Zosyn 


   -Protonix BID 


   -Zofran PRN for nausea/vomiting 





2. Cardiogenic Shock 2/2 New Onset Atrial Fibrillation with RVR 


   -Patient presented with atrial fibrillation in RVR. She was given Amiodarone 

and Digoxin and has since converted to sinus rhythm. Patients Atrial 

fibrillation with RVR was likely secondary to her acute abdominal pathology. She

was noted to be hypotensive with MAPs in the 50's. Lactic acid levels were 

normal. She did have an GALLO likely 2/2 hypotension. 


   -Patient had elevated troponins likely secondary to increased demand from a-

fib rvr and decreased Cardiac output. Troponins have plateaued 


   -Will stop digoxin and transition to Diltiazem short acting 30mg q6h. HOLD HR

< 70 or SBP <100 


   -Echocardiogram as above  


   -Eliquis on hold given duodenitis vs ulcer 





3. Acute Kidney Injury 


   -Likely secondary to hypotension from cardiogenic shock 2/2 to atrial 

fibrillation with RVR. Has resolved today. Will continue to monitor 





4. DVT Prophylaxis 


   -TEDS and Sequentials 





DISPOSITION: Discharge is pending clinical improvement





VS, I&O, 24H, Thaddeus


Vital Signs/I&O





Vital Signs








  Date Time  Temp Pulse Resp B/P (MAP) Pulse Ox O2 Delivery O2 Flow Rate FiO2


 


6/16/20 12:06  65  135/63    


 


6/16/20 12:00 98.2  16  99 Nasal Cannula 1.0 














I&O- Last 24 Hours up to 6 AM 


 


 6/16/20





 06:00


 


Intake Total 3160 ml


 


Output Total 1650 ml


 


Balance 1510 ml











Laboratory Data


24H LABS


Laboratory Tests 2


6/15/20 15:46: 


Lactic Acid Level 1.0, Troponin I 0.96H


6/15/20 17:51: Bedside Glucose (Misc Panel) 143H


6/15/20 17:54: 


6/15/20 20:40: Bedside Glucose (Misc Panel) 164H


6/15/20 21:50: Troponin I 0.89H


6/16/20 04:17: 


Immature Granulocyte % (Auto) 0.6, Neutrophils (%) (Auto) 75.4H, Lymphocytes (%)

(Auto) 19.0L, Monocytes (%) (Auto) 4.0, Eosinophils (%) (Auto) 0.7, Basophils 

(%) (Auto) 0.3, Neutrophils # (Auto) 13.0H, Lymphocytes # (Auto) 3.3, Monocytes 

# (Auto) 0.7, Eosinophils # (Auto) 0.1, Basophils # (Auto) 0.1, Nucleated Red 

Blood Cells % (auto) 0.0, Anion Gap 1L, Glomerular Filtration Rate 55.9, Calcium

Level 8.2L, Total Bilirubin 0.6, Aspartate Amino Transf (AST/SGOT) 27, Alanine 

Aminotransferase (ALT/SGPT) 31, Alkaline Phosphatase 70, C-Reactive Protein, 

Quantitative 17.10H, Total Protein 5.9L, Albumin 2.8L, Albumin/Globulin Ratio 

0.9L


6/16/20 07:52: Bedside Glucose (Misc Panel) 97


6/16/20 11:51: Bedside Glucose (Misc Panel) 147H


CBC/BMP


Laboratory Tests


6/16/20 04:17








Microbiology





Microbiology


6/14/20 Respiratory Virus Panel (PCR) ([facility]) - Final, Complete


          


6/14/20 Blood Culture - Preliminary, Resulted


          No Growth after 48 hours. All Specime...


6/14/20 Blood Culture - Preliminary, Resulted


          No Growth after 48 hours. All Specime...





GME ATTESTATION


GME ATTESTATION


My faculty preceptor for this patient encounter was physically present during 

the encounter and was fully available. All aspects of the patient interview, 

examination, medical decision making process, and medical care plan development 

were reviewed and approved by the faculty preceptor. The faculty preceptor is 

aware and concurs with the plan as stated in the body of this note and will 

attest to such by his/her cosignature.





ATTENDING NOTE


PT WAS SEEN AND EXAMINED BY ME, AGREE WITH THE ABOVE ASSESSMENT AND PLAN.











MARGARITO UMANA DO            Jun 16, 2020 14:22


LEIGH ANN NAVA MD             Jun 19, 2020 14:15

## 2020-06-16 NOTE — REP
Right upper quadrant sonography:

 

History: However quadrant pain.

 

Comparison study:  Comparison CT study June 14, 2020.

 

Findings: Scanning through the right upper quadrant of the abdomen demonstrates a

normal sized, thin-walled gallbladder without evidence of stone or polyp.  There

is some tenderness to scanning over the gallbladder.  Common bile duct is normal

measuring 0.4 cm in greatest diameter.  No focal liver lesion is seen.  Liver

size is normal.  The pancreas is largely obscured by abdominal gas.  No

pancreatic abnormality is observed.  No right renal abnormality is seen.  There

is no evidence of ascites.  The right kidney measures 10.9 x 5.4 x 4.8 cm.

 

Impression:

 

There is some tenderness to scanning over the gallbladder.  Otherwise negative

right upper quadrant sonography.

 

 

Electronically Signed by

Kalyan Diallo MD 06/16/2020 09:22 A

## 2020-06-17 VITALS — DIASTOLIC BLOOD PRESSURE: 72 MMHG | SYSTOLIC BLOOD PRESSURE: 159 MMHG

## 2020-06-17 VITALS — SYSTOLIC BLOOD PRESSURE: 132 MMHG | DIASTOLIC BLOOD PRESSURE: 76 MMHG

## 2020-06-17 VITALS — SYSTOLIC BLOOD PRESSURE: 156 MMHG | DIASTOLIC BLOOD PRESSURE: 72 MMHG

## 2020-06-17 VITALS — SYSTOLIC BLOOD PRESSURE: 150 MMHG | DIASTOLIC BLOOD PRESSURE: 82 MMHG

## 2020-06-17 VITALS — DIASTOLIC BLOOD PRESSURE: 72 MMHG | SYSTOLIC BLOOD PRESSURE: 150 MMHG

## 2020-06-17 VITALS — SYSTOLIC BLOOD PRESSURE: 147 MMHG | DIASTOLIC BLOOD PRESSURE: 70 MMHG

## 2020-06-17 LAB
ALBUMIN SERPL BCG-MCNC: 2.6 GM/DL (ref 3.2–5.2)
ALT SERPL W P-5'-P-CCNC: 25 U/L (ref 12–78)
BASOPHILS # BLD AUTO: 0 10^3/UL (ref 0–0.2)
BASOPHILS NFR BLD AUTO: 0.2 % (ref 0–1)
BILIRUB SERPL-MCNC: 0.6 MG/DL (ref 0.2–1)
BUN SERPL-MCNC: 17 MG/DL (ref 7–18)
CALCIUM SERPL-MCNC: 8 MG/DL (ref 8.8–10.2)
CHLORIDE SERPL-SCNC: 110 MEQ/L (ref 98–107)
CO2 SERPL-SCNC: 29 MEQ/L (ref 21–32)
CREAT SERPL-MCNC: 0.83 MG/DL (ref 0.55–1.3)
EOSINOPHIL # BLD AUTO: 0.2 10^3/UL (ref 0–0.5)
EOSINOPHIL NFR BLD AUTO: 1.2 % (ref 0–3)
GFR SERPL CREATININE-BSD FRML MDRD: > 60 ML/MIN/{1.73_M2} (ref 39–?)
GLUCOSE SERPL-MCNC: 100 MG/DL (ref 70–100)
HCT VFR BLD AUTO: 32.1 % (ref 36–47)
HGB BLD-MCNC: 10.5 G/DL (ref 12–15.5)
LYMPHOCYTES # BLD AUTO: 3.1 10^3/UL (ref 1.5–5)
LYMPHOCYTES NFR BLD AUTO: 24.1 % (ref 24–44)
MCH RBC QN AUTO: 30.8 PG (ref 27–33)
MCHC RBC AUTO-ENTMCNC: 32.7 G/DL (ref 32–36.5)
MCV RBC AUTO: 94.1 FL (ref 80–96)
MONOCYTES # BLD AUTO: 0.8 10^3/UL (ref 0–0.8)
MONOCYTES NFR BLD AUTO: 6.2 % (ref 0–5)
NEUTROPHILS # BLD AUTO: 8.6 10^3/UL (ref 1.5–8.5)
NEUTROPHILS NFR BLD AUTO: 67.8 % (ref 36–66)
PLATELET # BLD AUTO: 143 10^3/UL (ref 150–450)
POTASSIUM SERPL-SCNC: 3.9 MEQ/L (ref 3.5–5.1)
PROT SERPL-MCNC: 5.6 GM/DL (ref 6.4–8.2)
RBC # BLD AUTO: 3.41 10^6/UL (ref 4–5.4)
SODIUM SERPL-SCNC: 143 MEQ/L (ref 136–145)
WBC # BLD AUTO: 12.7 10^3/UL (ref 4–10)

## 2020-06-17 RX ADMIN — DEXTROSE MONOHYDRATE SCH MG: 50 INJECTION, SOLUTION INTRAVENOUS at 08:58

## 2020-06-17 RX ADMIN — POLYETHYLENE GLYCOL 3350 SCH PKT: 17 POWDER, FOR SOLUTION ORAL at 21:00

## 2020-06-17 RX ADMIN — POLYETHYLENE GLYCOL 3350 SCH PKT: 17 POWDER, FOR SOLUTION ORAL at 08:59

## 2020-06-17 RX ADMIN — SODIUM CHLORIDE SCH MLS/HR: 9 INJECTION, SOLUTION INTRAVENOUS at 04:46

## 2020-06-17 RX ADMIN — INSULIN LISPRO SCH UNITS: 100 INJECTION, SOLUTION INTRAVENOUS; SUBCUTANEOUS at 17:54

## 2020-06-17 RX ADMIN — INSULIN LISPRO SCH UNITS: 100 INJECTION, SOLUTION INTRAVENOUS; SUBCUTANEOUS at 07:30

## 2020-06-17 RX ADMIN — DEXTROSE MONOHYDRATE SCH MLS/HR: 5 INJECTION INTRAVENOUS at 21:16

## 2020-06-17 RX ADMIN — DEXTROSE MONOHYDRATE SCH MG: 50 INJECTION, SOLUTION INTRAVENOUS at 21:16

## 2020-06-17 RX ADMIN — DEXTROSE MONOHYDRATE SCH MLS/HR: 5 INJECTION INTRAVENOUS at 16:35

## 2020-06-17 RX ADMIN — DEXTROSE MONOHYDRATE SCH MLS/HR: 5 INJECTION INTRAVENOUS at 08:59

## 2020-06-17 RX ADMIN — INSULIN LISPRO SCH UNITS: 100 INJECTION, SOLUTION INTRAVENOUS; SUBCUTANEOUS at 21:00

## 2020-06-17 RX ADMIN — SODIUM CHLORIDE, PRESERVATIVE FREE SCH ML: 5 INJECTION INTRAVENOUS at 14:00

## 2020-06-17 RX ADMIN — DEXTROSE MONOHYDRATE SCH MLS/HR: 5 INJECTION INTRAVENOUS at 04:46

## 2020-06-17 RX ADMIN — SODIUM CHLORIDE, PRESERVATIVE FREE SCH ML: 5 INJECTION INTRAVENOUS at 21:16

## 2020-06-17 RX ADMIN — INSULIN LISPRO SCH UNITS: 100 INJECTION, SOLUTION INTRAVENOUS; SUBCUTANEOUS at 12:20

## 2020-06-17 NOTE — CR.PDOC
General Surgery Consultation


Date of Consultation


20





History and Physical


CONSULT REPORT FOR: hospitalist service





REASON FOR CONSULTATION: abdominal pain, duodenal perforation





HISTORY OF PRESENT ILLNESS: 


Patient is a 75-year-old female admitted on 2020 when she presented to the

emergency room with sudden onset of upper abdominal pain radiating to her chest 

area was found to be hypotensive on rapid atrial fibrillation. Patient is hard o

f hearing so it is hard to communicate with her but her story has been fairly 

consistent according to the nurse. She had sudden onset of severe upper 

abdominal pain radiating to her mid chest area though at that time on review of 

the notes on her H&P, her description the pain wasn't too clear. She was 

evaluated in the emergency room and found to be hypotensive and this was 

presumed to be from her atrial fibrillation with a rapid ventricular heart rate 

above 100 for which she received treatment including amiodarone and digoxin. 

Patient felt constipated couple days prior to this and she was concerned whether

her constipation is something to do with this. In the ER she had a leukocytosis 

of 15.6. LFTs were normal. Her initial troponin was normal. She had a CT a

ngiogram done extending to the chest and the abdomen which ruled out a PE. 

Abdominal x-ray did not show any free air or bowel obstruction. She was presumed

to be in cardiogenic shock rapid atrial fibrillation and treated as such. She 

was started on Levaquin us for atrial fibrillation. They were suspecting some 

systemic inflammatory response driving discharged to the leukocytosis but 

initially was unclear on what the source was. Presumably the abdominal pain has 

resolved while she was in the emergency room. She was later admitted on to the 

ICU. She continued to have leukocytosis and she was started on Rocephin 

yesterday. Her white count climbed up to 22,000. Her abdominal pain apparently 

was improving at that time. A gallbladder ultrasound was ordered for and was 

done this morning. Presumably a call from radiology came in today saying there 

was some microperforation around the duodenum and a CT of the abdomen and pelvis

was again repeated today with findings of possibly a retroperitoneal perforation

of the duodenum without any evidence of free intraperitoneal perforation and 

thus I was called in to evaluate the patient and made recommendations. On 

talking to the patient she reports her pain is much better than it was 2 days 

ago. She appears self converted back to sinus rhythm and her blood pressure has 

been stable at least for the past 24 hours. She still analogous for atrial 

fibrillation. She has been tried on oral diet yesterday but was promptly brought

back to nothing by mouth status pending the current evaluation. She is now on 

Zosyn 3.375 g IV and her white count this morning was 17.3.





PAST MEDICAL/ SURGICAL HISTORY:


Chronic hypertension


Diabetes with neuropathy


Dementia


Impaired hearing


Cataract surgery


Nephrolithiasis


 4


Bilateral cataract surgery





SOCIAL HISTORY:


Former smoker





FAMILY HISTORY:


Diabetes.


Colon cancer


Stroke


HOME MEDICATIONS: Please see below.





REVIEW OF SYSTEMS:


GENERAL: Patient symptoms were about 2 days in duration of sudden onset. Prior 

to this she was at her baseline health.


HEENT: She is hard of hearing specially on the right side.


NECK:  Denies any neck pain


CARDIOVASCULAR: She currently is on sinus but was on atrial fibrillation. She is

denying any chest discomfort at this time though on presentation 2 days ago was 

having some radiation of the abdominal pain to her mid chest.


SKIN: Denies rash.


NEUROLOGIC: She denies any prior history of stroke.


ENDOCRINE: She denies any diabetes.


HEMATOLOGY/ONCOLOGY: She has been started on liquids. She denies any prior 

bleeding or clotting problems.


PULMONARY: Denies chronic cough, dyspnea and wheezing.


GASTROINTESTINAL: See HPI.


GENITOURINARY: Denies dysuria, frequency, hematuria and nocturia.


INFECTIOUS: Denies any recent upper respiratory tract infection, UTI, need for 

use of antibiotics.


NUTRITION: Reports fair appetite. Denies any abnormal weight loss.





PHYSICAL EXAMINATION:


VITALS SIGNS: Please see below.


GENERAL APPEARANCE: On exam she looks relatively comfortable. She is awake alert

and relatively oriented. Communication was a bit hard because she is hard of 

hearing but she does answer questions appropriately. She appears her stated age,

relatively healthy baseline in appearance.


SKIN: Warm and dry. No jaundice or skin rashes.


HEENT: Normocephalic,  atraumatic. Pink palpebral conjunctiva, anicteric 

sclerae. Lips and mucosa appear moist.


NECK: Supple, no thyromegaly. No obvious jugular venous distention.


LUNGS: Clear to auscultation bilaterally. No wheezing appreciated.


HEART: No chest wall abnormalities. Regular rate and rhythm with no murmurs 

appreciated.


ABDOMEN: Abdomen is round, mildly obese, soft, and mildly distended and slightly

tympanitic on percussion. She has a lower vertical midline as well as penicillin

incision. No obvious herniation at the incision sites are umbilicus or groin 

area. She is minimal tenderness on palpation around the epigastric area but this

becomes more prominent towards the right upper quadrant and right upper quadrant

area with no guarding. She has no CVA tenderness or flank tenderness in both 

sides. No flank hematoma.


EXTREMITIES: No significant extremity edema





ANCILLARIES: .





LABORATORY DATA: Please see below.





IMAGING STUDIES: .


I reviewed her imaging studies including a CT angiogram of the chest and abdomen

done on 2020. She had an abdominal x-ray done at that time too. She has a 

new gallbladder ultrasound done today in a CT abdomen and pelvis also done 

today. On my request an upper GI series was also performed today





IMPRESSION AND PLAN:





Contained duodenal perforation


Atrial fibrillation





Patient most likely had duodenal perforation at the time of presentation. On 

review of the imaging done in the emergency room she has evident air around the 

duodenal sweep was swell as a scant amount of fluid along the Lo's pouch 

consistent with that history. This appears to have been missed by the 

radiologist. This most likely was the  for the rapid atrial fibrillation. 

Remarkably despite this she appears to have gotten better so most likely the 

perforation has sealed up probably almost immediately. She was started on 

Rocephin yesterday and this is converted to Zosyn 3. recent febrile grams IV. 

She was also now on pantoprazole 40 mg IV twice a day. I agree with this to 

interventions. On repeat CT done today there is some leftover air at the 

retroperitoneum around the duodenal sweep but no longer have any fluid and there

is no progression off this nor any signs of severe inflammation. I asked 

radiology to do an upper GI series to confirm that there is a contained 

perforation and no continuous leakage and this seems to show that there is no 

persistent leakage she was shown to have a medially and posteriorly directed 

diverticulum and some mild duodenitis but no evident ulcer. I will still treat 

this as a duodenal ulcer with perforation though certainly perforation of the 

diverticulum with resulting diverticulitis is also possible he. She is currently

nothing by mouth at but with the absence of active leakage of fluid (clear 

liquids today and see how she does. She is not showing any signs of peritonitis 

as most of the perforation seems to be directed retroperitoneally. I will follow

along with her progress but despite of the missed diagnosis she seems to be 

recovering well. I anticipate that she will continue to get better we could 

gradually advance her diet. When she is ready go home she should be at least on 

once a day Protonix. Tentatively between 6-10 weeks ago and like to do an upper 

endoscopy to evaluate the area for proper healing and looked for any persistent 

ulcers, test for H. pylori. H. pylori antigen testing will also be done at this 

time and if she is positive directed antibiotics to that. I have spoken to her 

daughter about the findings and explained to her what happened and plan of care.





Vital Signs





Vital Signs








  Date Time  Temp Pulse Resp B/P (MAP) Pulse Ox O2 Delivery O2 Flow Rate FiO2


 


20 04:00       2.0 


 


20 00:00 97.7 70 18 147/70 (95) 100 Nasal Cannula  








I&Os











I&O- Last 24 Hours up to 6 AM 


 


 20





 06:00


 


Intake Total 1410 ml


 


Output Total 2725 ml


 


Balance -1315 ml











Laboratory Data


Labs 24H


Laboratory Tests 2


20 07:52: Bedside Glucose (Misc Panel) 97


20 11:51: Bedside Glucose (Misc Panel) 147H


20 17:28: Bedside Glucose (Misc Panel) 163H


20 20:25: Bedside Glucose (Misc Panel) 115H


Microbiology





Microbiology


20 Respiratory Virus Panel (PCR) ([facility]) - Final, Complete


          


20 Blood Culture - Preliminary, Resulted


          No Growth after 48 hours. All Specime...


20 Blood Culture - Preliminary, Resulted


          No Growth after 48 hours. All Specime...





Home Medications


Scheduled


Lisinopril (Lisinopril) 10 Mg Tablet, 10 MG PO DAILY, (Reported)


Metformin HCl (Metformin HCl) 500 Mg Tablet, 500 MG PO DAILY, (Reported)





Allergies


Coded Allergies:  


     No Known Allergies (Verified  Allergy, Unknown, 20)











TEO LOJA MD         2020 05:25

## 2020-06-17 NOTE — IPNPDOC
Text Note


Date of Service


The patient was seen on 6/17/20.





NOTE


Patient follows up today. She had an uneventful overnight course. She still c

omplains of pain on the right lower quadrant area. She seems to be tolerating 

clear liquids and this was been advanced by the primary team to full liquids 

diet. She remains in sinus rhythm.





Vital signs 


MAXIMUM TEMPERATURE of 98.2. CURRENT TEMPERATURE 98.2. Heart rates 70 sinus 

rhythm





Examination


Patient looks comfortable


Awake alert and oriented


Skin is warm and dry


Regular heart rate and rhythm


Abdomen is soft, nondistended tender over the right lower quadrant/right lateral

lower abdomen. Nontender at the epigastric area no rebound or guarding


Minimal lower extremity edema





Laboratories WBC 12.7


LFTs are normal





Impression and plan


Acute duodenal perforation probably also related though possibility of the 

diverticula in the duodenum also perforating. Continue with IV Protonix. If she 

is tolerating that this can be switched to oral Protonix. Likewise probably can 

convert the antibiotics orally. Please maintain her a soft diet for about 2 

weeks or so. Plan for interval upper endoscopy at about 3 month's time.





VS,Kiloe, I+O


VS, Thaddeus, I+O


Laboratory Tests


6/17/20 04:56











Vital Signs








  Date Time  Temp Pulse Resp B/P (MAP) Pulse Ox O2 Delivery O2 Flow Rate FiO2


 


6/17/20 08:00 98.2 69 20 159/72 (101) 96 Nasal Cannula 2.0 














I&O- Last 24 Hours up to 6 AM 


 


 6/17/20





 06:00


 


Intake Total 2170 ml


 


Output Total 3225 ml


 


Balance -1055 ml

















TEO LOJA MD         Jun 17, 2020 12:22

## 2020-06-17 NOTE — IPNPDOC
Date Seen


The patient was seen on 6/17/20.





Progress Note


SUBJECTIVE: Patient was seen and examined this morning. She is hard of hearing 

and her hearing aid appears to be malfunctioning as it can be heard ringing from

well outside the room. There were no adverse events overnight and she has 

tolerated a clear liquids diet. This morning she states that she has significant

improvement in her pain. She denies any chest pain. She denies any shortness of 

breath. She denies any palpitations .





OBJECTIVE


PHYSICAL EXAMINATION:


VITAL SIGNS: Please see below. 


GENERAL: Awake, alert, and oriented. Hard of hearing. Does not appear to be in 

any acute distress.


HEENT: Atraumatic, normocephalic. Eyes are nonicteric. Trachea is midline. Left 

sided hearing aid in place 


CARDIOVASCULAR: Normal S1, S2. Regular rate and rhythm. No clicks rubs or 

murmurs. 


RESPIRATORY: Clear vesicular breath sounds bilaterally. Slightly diminished 

breath sounds in the bases. Good respiratory effort. No wheezes, rhonchi, or 

rales


ABDOMINAL: Soft, nondistended. Tenderness to right upper quadrant although 

improved from previous examination. No rebound tenderness or guarding. No

rmoactive bowel sounds 


EXTREMITIES: No edema. Full and equal pulses in bilateral upper and lower ex

tremities 


NEUROLOGICAL: No focal neurological deficits 


PSYCHOLOGICAL: Mood and affect appear appropriate 





LABORATORY DATA, IMAGING STUDIES, MICROBIOLOGY: Please see below.





DATE OF PROCEDURE:  06/15/2020


 


REFERRING PHYSICIAN:  Dr. Audrey Navarro


 


INDICATION:  Dyspnea.


 


Height 168 cm, weight 79 kg.


 


DIMENSIONS:


IVS:  1.4


LV:  3.7


LVPW:  1.3


LA:  3.5


Aorta:  3.1


IVC:  1.6


Mitral E wave velocity:  83


A wave:  87


E prime septal:  4.1


E prime lateral:  5.7


 


FINDINGS:


The study is of rather limited quality.  It was a sitting exam with 

uncooperative


patient.


The patient is in sinus rhythm.


 


Left ventricle has normal size and normal systolic function, I estimate ejection


fraction (EF) around 65-70%.  Mild left ventricular hypertrophy is present.


Right ventricle is normal size and systolic function.  Both atria appear at 

least


mildly enlarged.  Aortic valve is sclerotic.  There appears some minimal


restriction of cusp mobility, but visualization was poor.  There are also


degenerative abnormalities of mitral valve with mitral annular calcifications 

but


mobility of leaflet seems preserved.  Tricuspid valve appears normal.  Pulmonic


valve also appears normal.  No pericardial effusion is noted.  Inferior vena 

cava


is normal size and appropriately collapses with inspiration indicative of likely


normal central venous pressure.  Aortic root is normal.  Aortic arch and


abdominal aorta were not well seen.


 


Doppler interrogation of aortic valve reveals no significant insufficiency and


fairly trivial stenosis with mean gradient 6 mmHg.  There is competent mitral


valve without stenosis or insufficiency, trace tricuspid insufficiency and trace


pulmonic insufficiency.  Calculated pulmonary artery pressure is in 20s


corresponding to normal values.


 


Mitral inflow pattern and tissue Doppler imaging of mitral annulus revealed 

grade


1 diastolic dysfunction.


 


CONCLUSIONS:


1.  Study is of fair technical quality, the patient is in sinus rhythm.


2.  Normal left ventricular (LV) size with mild left ventricular hypertrophy


(LVH),  preserved LV systolic function and grade 1 diastolic dysfunction.


3.  Aortic sclerosis but without significant insufficiency and only trivial


stenosis.


4.  Degenerative abnormalities of mitral valve but without functional


consequence.


5.  Trace tricuspid and pulmonic insufficiency.


6.  Likely normal central venous pressure and normal pulmonary artery pressure.


 


COMMENT:  Subacute bacterial endocarditis (SBE) prophylaxis is not recommended.


The study is most consistent with mild form of hypertensive heart disease but


does not provide obvious explanation for etiology of dyspnea.


DD:   Stewart Moreno MD  06/15/20 1919


DT:   DAYO  06/15/20 6014  








DVT prophylaxis ordered?: TEDS and Sequentials 





ASSESSMENT AND PLAN: Patient is a 75 year old female who presented to the [facility] 

with a complaint of abdominal pain and found to be in atrial fibrillation with 

RVR. CT imaging on presentation was negative for any acute findings however 

review of CT imaging demonstrated possible perforated duodenal ulcer 





PROBLEMS:


1. Abdominal Pain 2/2 Perforated Duodenal Ulcer vs Duodenitis 


   -As stated previously patient has CT imaging which was read to demonstrate 

possible microperforation from a perforated duodenal ulcer. General Surgery was 

consulted and the patient was evaluated. She had an upper GI series completed 

yesterday which did not demonstrate and extravasation of contrast material. It 

appears the ulcer likely healed on its own without surgical intervention. Her 

diet was advanced to clear liquids today which she has tolerated well. 


   -General Surgery has been consulted and case was discussed. Assistance is 

appreciated 


   -Will advance diet to full liquids and then mechanical soft. Patient will 

need to remain on mechanical soft discharge until follow-up with General Surgery

for endoscopy 


   -Eliquis on hold in light of duodenitis/ulcer. This will need to be resumed 

at discharge 


   -Continue IV Zosyn. Will likely transition to Augmentin tomorrow 


   -Protonix BID. Will transition to oral Protonix. She will need to continue 

this for a least 6 weeks given her ulcer


   -Will need outpatient endoscopy with biopsy to assess for h. pylori


   -Zofran PRN for nausea/vomiting 





2. Cardiogenic Shock 2/2 New Onset Atrial Fibrillation with RVR 


   -Patient presented with atrial fibrillation in RVR. She was given Amiodarone 

and Digoxin and has since converted to sinus rhythm. Patients Atrial 

fibrillation with RVR was likely secondary to her acute abdominal pathology. She

was noted to be hypotensive with MAPs in the 50's. Lactic acid levels were 

normal. She did have an GALLO likely 2/2 hypotension. 


   -Continue Diltiazem q6h dosing. Will transition over to long acting diltiazem


   -Echocardiogram as above  


   -Eliquis on hold given duodenitis vs ulcer. Will need to resume on discharge.

 





3. Acute Kidney Injury 


   -Likely secondary to hypotension from cardiogenic shock 2/2 to atrial 

fibrillation with RVR. 


   -Resolved. 





4. DVT Prophylaxis 


   -TEDS and Sequentials 








DISPOSITION: Patients discharge is pending clinical improvement. Likely 24-48 

hours





VS, I&O, 24H, Fishbone


Vital Signs/I&O





Vital Signs








  Date Time  Temp Pulse Resp B/P (MAP) Pulse Ox O2 Delivery O2 Flow Rate FiO2


 


6/17/20 16:00 98.7 69 15 132/76 (94) 94 Room Air  


 


6/17/20 12:00       2.0 














I&O- Last 24 Hours up to 6 AM 


 


 6/17/20





 06:00


 


Intake Total 2170 ml


 


Output Total 3225 ml


 


Balance -1055 ml











Laboratory Data


24H LABS


Laboratory Tests 2


6/16/20 17:28: Bedside Glucose (Misc Panel) 163H


6/16/20 20:25: Bedside Glucose (Misc Panel) 115H


6/17/20 04:56: 


Immature Granulocyte % (Auto) 0.5, Neutrophils (%) (Auto) 67.8H, Lymphocytes (%)

(Auto) 24.1, Monocytes (%) (Auto) 6.2H, Eosinophils (%) (Auto) 1.2, Basophils 

(%) (Auto) 0.2, Neutrophils # (Auto) 8.6H, Lymphocytes # (Auto) 3.1, Monocytes #

(Auto) 0.8, Eosinophils # (Auto) 0.2, Basophils # (Auto) 0.0, Nucleated Red 

Blood Cells % (auto) 0.0, Anion Gap 4L, Glomerular Filtration Rate > 60.0, 

Calcium Level 8.0L, Total Bilirubin 0.6, Aspartate Amino Transf (AST/SGOT) 20, 

Alanine Aminotransferase (ALT/SGPT) 25, Alkaline Phosphatase 74, Total Protein 

5.6L, Albumin 2.6L, Albumin/Globulin Ratio 0.9L


6/17/20 12:07: Bedside Glucose (Misc Panel) 224H


CBC/BMP


Laboratory Tests


6/17/20 04:56








Microbiology





Microbiology


6/14/20 Respiratory Virus Panel (PCR) (SHANIA) - Final, Complete


          


6/14/20 Blood Culture - Preliminary, Resulted


          No Growth after 72 hours. All specime...


6/14/20 Blood Culture - Preliminary, Resulted


          No Growth after 72 hours. All specime...





GME ATTESTATION


GME ATTESTATION


My faculty preceptor for this patient encounter was physically present during 

the encounter and was fully available. All aspects of the patient interview, 

examination, medical decision making process, and medical care plan development 

were reviewed and approved by the faculty preceptor. The faculty preceptor is 

aware and concurs with the plan as stated in the body of this note and will 

attest to such by his/her cosignature.





ATTENDING NOTE


PT WAS SEEN AND EXAMINED BY ME, AGREE WITH THE ABOVE ASSESSMENT AND PLAN.











MARGARITO UMANA DO            Jun 17, 2020 16:57


LEIGH ANN NAVA MD             Jun 19, 2020 14:18

## 2020-06-18 VITALS — DIASTOLIC BLOOD PRESSURE: 72 MMHG | SYSTOLIC BLOOD PRESSURE: 144 MMHG

## 2020-06-18 VITALS — SYSTOLIC BLOOD PRESSURE: 160 MMHG | DIASTOLIC BLOOD PRESSURE: 74 MMHG

## 2020-06-18 VITALS — SYSTOLIC BLOOD PRESSURE: 170 MMHG | DIASTOLIC BLOOD PRESSURE: 70 MMHG

## 2020-06-18 VITALS — SYSTOLIC BLOOD PRESSURE: 178 MMHG | DIASTOLIC BLOOD PRESSURE: 84 MMHG

## 2020-06-18 VITALS — SYSTOLIC BLOOD PRESSURE: 140 MMHG | DIASTOLIC BLOOD PRESSURE: 62 MMHG

## 2020-06-18 VITALS — DIASTOLIC BLOOD PRESSURE: 70 MMHG | SYSTOLIC BLOOD PRESSURE: 150 MMHG

## 2020-06-18 LAB
ALBUMIN SERPL BCG-MCNC: 2.9 GM/DL (ref 3.2–5.2)
ALT SERPL W P-5'-P-CCNC: 29 U/L (ref 12–78)
BASOPHILS # BLD AUTO: 0.1 10^3/UL (ref 0–0.2)
BASOPHILS NFR BLD AUTO: 0.4 % (ref 0–1)
BILIRUB SERPL-MCNC: 0.8 MG/DL (ref 0.2–1)
BUN SERPL-MCNC: 11 MG/DL (ref 7–18)
CALCIUM SERPL-MCNC: 8.3 MG/DL (ref 8.8–10.2)
CHLORIDE SERPL-SCNC: 107 MEQ/L (ref 98–107)
CO2 SERPL-SCNC: 27 MEQ/L (ref 21–32)
CREAT SERPL-MCNC: 0.88 MG/DL (ref 0.55–1.3)
EOSINOPHIL # BLD AUTO: 0.1 10^3/UL (ref 0–0.5)
EOSINOPHIL NFR BLD AUTO: 1 % (ref 0–3)
GFR SERPL CREATININE-BSD FRML MDRD: > 60 ML/MIN/{1.73_M2} (ref 39–?)
GLUCOSE SERPL-MCNC: 151 MG/DL (ref 70–100)
HCT VFR BLD AUTO: 34.9 % (ref 36–47)
HGB BLD-MCNC: 11.4 G/DL (ref 12–15.5)
LYMPHOCYTES # BLD AUTO: 3 10^3/UL (ref 1.5–5)
LYMPHOCYTES NFR BLD AUTO: 21.6 % (ref 24–44)
MCH RBC QN AUTO: 30.3 PG (ref 27–33)
MCHC RBC AUTO-ENTMCNC: 32.7 G/DL (ref 32–36.5)
MCV RBC AUTO: 92.8 FL (ref 80–96)
MONOCYTES # BLD AUTO: 1 10^3/UL (ref 0–0.8)
MONOCYTES NFR BLD AUTO: 7.3 % (ref 0–5)
NEUTROPHILS # BLD AUTO: 9.7 10^3/UL (ref 1.5–8.5)
NEUTROPHILS NFR BLD AUTO: 69.1 % (ref 36–66)
PLATELET # BLD AUTO: 187 10^3/UL (ref 150–450)
POTASSIUM SERPL-SCNC: 3.9 MEQ/L (ref 3.5–5.1)
PROT SERPL-MCNC: 6.1 GM/DL (ref 6.4–8.2)
RBC # BLD AUTO: 3.76 10^6/UL (ref 4–5.4)
SODIUM SERPL-SCNC: 142 MEQ/L (ref 136–145)
WBC # BLD AUTO: 14 10^3/UL (ref 4–10)

## 2020-06-18 RX ADMIN — INSULIN LISPRO SCH UNITS: 100 INJECTION, SOLUTION INTRAVENOUS; SUBCUTANEOUS at 17:46

## 2020-06-18 RX ADMIN — MORPHINE SULFATE PRN MG: 2 INJECTION, SOLUTION INTRAMUSCULAR; INTRAVENOUS at 03:56

## 2020-06-18 RX ADMIN — ACETAMINOPHEN PRN MG: 325 TABLET ORAL at 03:39

## 2020-06-18 RX ADMIN — POLYETHYLENE GLYCOL 3350 SCH PKT: 17 POWDER, FOR SOLUTION ORAL at 09:21

## 2020-06-18 RX ADMIN — DEXTROSE MONOHYDRATE SCH MLS/HR: 5 INJECTION INTRAVENOUS at 09:21

## 2020-06-18 RX ADMIN — SODIUM CHLORIDE, PRESERVATIVE FREE SCH ML: 5 INJECTION INTRAVENOUS at 14:26

## 2020-06-18 RX ADMIN — SODIUM CHLORIDE, PRESERVATIVE FREE SCH ML: 5 INJECTION INTRAVENOUS at 05:31

## 2020-06-18 RX ADMIN — INSULIN LISPRO SCH UNITS: 100 INJECTION, SOLUTION INTRAVENOUS; SUBCUTANEOUS at 09:21

## 2020-06-18 RX ADMIN — DEXTROSE MONOHYDRATE SCH MLS/HR: 5 INJECTION INTRAVENOUS at 03:40

## 2020-06-18 RX ADMIN — DEXTROSE MONOHYDRATE SCH MG: 50 INJECTION, SOLUTION INTRAVENOUS at 09:21

## 2020-06-18 RX ADMIN — INSULIN LISPRO SCH UNITS: 100 INJECTION, SOLUTION INTRAVENOUS; SUBCUTANEOUS at 12:47

## 2020-06-18 NOTE — DS.PDOC
Discharge Summary


General


Date of Admission


Jun 14, 2020 at 21:38


Date of Discharge


06/18/20





Discharge Summary


Chief complaints: Abdominal pain





Final diagnosis


Microperforation of the duodenal ulcer


A. fib with RVR


HPI





History of present illness and Hospital course


Patient is a 75 year old female who presented to the [facility] with a complaint of 

abdominal pain and found to be in atrial fibrillation with RVR. CT imaging on 

presentation was negative for any acute findings however review of CT imaging 

demonstrated possible perforated duodenal ulcer 


Abdominal Pain 2/2 Perforated Duodenal Ulcer vs Duodenitis.  patient has CT 

imaging which was read to demonstrate possible microperforation from a 

perforated duodenal ulcer. General Surgery was consulted and the patient was 

evaluated. She had an upper GI series completed yesterday which did not 

demonstrate and extravasation of contrast material. It appears the ulcer likely 

healed on its own without surgical intervention. Her diet was advanced to clear 

liquids today which she has tolerated well. advanced diet to full liquids and 

then mechanical soft and tolerated. He was transitioned to Augmentin for 4 more 

days. The patient also had Cardiogenic Shock 2/2 New Onset Atrial Fibrillation 

with RVR . Patient presented with atrial fibrillation in RVR. She was given 

Amiodarone and Digoxin and has since converted to sinus rhythm. Patients Atrial 

fibrillation with RVR was likely secondary to her acute abdominal pathology. She

was noted to be hypotensive with MAPs in the 50's. Lactic acid levels were 

normal. She was given diltiazem and currently is on the seventh 30 every 12 

hours. She has not been able to tolerate higher doses of due to some as appr

opriate pressure becomes very fragile. She'll be discharged on this regime and 

the patient also will be started on Eliquis and asked to follow with the 

cardiologist as an outpatient and PCP as an outpatient in one week.








PHYSICAL EXAMINATION:


VITAL SIGNS: Please see below. 


GENERAL: Awake, alert, and oriented. Hard of hearing. Does not appear to be in 

any acute distress.


HEENT: Atraumatic, normocephalic. Eyes are nonicteric. Trachea is midline. Left 

sided hearing aid in place 


CARDIOVASCULAR: Normal S1, S2. Regular rate and rhythm. No clicks rubs or 

murmurs. 


RESPIRATORY: Clear vesicular breath sounds bilaterally. Slightly diminished 

breath sounds in the bases. Good respiratory effort. No wheezes, rhonchi, or 

rales


ABDOMINAL: Soft, nondistended. Tenderness to right upper quadrant although 

improved from previous examination. No rebound tenderness or guarding. 

Normoactive bowel sounds 


EXTREMITIES: No edema. Full and equal pulses in bilateral upper and lower 

extremities 


NEUROLOGICAL: No focal neurological deficits 


PSYCHOLOGICAL: Mood and affect appear appropriate 





Medications. As per discharge reconciliation medication list. Diltiazem Eliquis 

have been added.





Activity as tolerated





Diet. 2 g sodium diet





Follow-up appointments. PCP in 1 week, surgery in 1 week,





Condition on discharge. Patient is medically optimized for discharge





Discharge disposition: Home





Total time spent on this discharge including coordination of care, review of 

chart documentation and actual contact is around 35 minutes





Vital Signs/I&Os





Vital Signs








  Date Time  Temp Pulse Resp B/P (MAP) Pulse Ox O2 Delivery O2 Flow Rate FiO2


 


6/18/20 12:00 97.6 74 18 144/72 (96) 97 Room Air  


 


6/17/20 12:00       2.0 














I&O- Last 24 Hours up to 6 AM 


 


 6/18/20





 06:00


 


Intake Total 2362 ml


 


Output Total 3000 ml


 


Balance -638 ml











Laboratory Data


Labs 24H


Laboratory Tests 2


6/17/20 16:34: Bedside Glucose (Misc Panel) 125H


6/17/20 21:05: Bedside Glucose (Misc Panel) 177H


6/18/20 05:23: 


Immature Granulocyte % (Auto) 0.6, Neutrophils (%) (Auto) 69.1H, Lymphocytes (%)

(Auto) 21.6L, Monocytes (%) (Auto) 7.3H, Eosinophils (%) (Auto) 1.0, Basophils 

(%) (Auto) 0.4, Neutrophils # (Auto) 9.7H, Lymphocytes # (Auto) 3.0, Monocytes #

(Auto) 1.0H, Eosinophils # (Auto) 0.1, Basophils # (Auto) 0.1, Nucleated Red 

Blood Cells % (auto) 0.0, Anion Gap 8, Glomerular Filtration Rate > 60.0, 

Calcium Level 8.3L, Total Bilirubin 0.8, Aspartate Amino Transf (AST/SGOT) 20, 

Alanine Aminotransferase (ALT/SGPT) 29, Alkaline Phosphatase 78, Total Protein 

6.1L, Albumin 2.9L, Albumin/Globulin Ratio 0.9L


6/18/20 12:02: Bedside Glucose (Misc Panel) 207H


CBC/BMP


Laboratory Tests


6/18/20 05:23








FSBS





Laboratory Tests








Test


 6/17/20


16:34 6/17/20


21:05 6/18/20


12:02 Range/Units


 


 


Bedside Glucose (Misc Panel) 125 177 207   MG/DL











Microbiology





Microbiology


6/14/20 Respiratory Virus Panel (PCR) (SHANIA) - Final, Complete


          


6/14/20 Blood Culture - Preliminary, Resulted


          No Growth after 72 hours. All specime...


6/14/20 Blood Culture - Preliminary, Resulted


          No Growth after 72 hours. All specime...





Discharge Medications


Scheduled


Amoxicillin/Potassium Clav (Amox-Clav 875-125 mg Tablet) 1 Each Tablet, 875 MG 

PO BID


Apixaban (Eliquis) 5 Mg Tablet, 1 TAB PO BID


Diltiazem HCl (Diltiazem HCl) 30 Mg Tablet, 30 MG PO BID


Lisinopril (Lisinopril) 10 Mg Tablet, 10 MG PO DAILY, (Reported)


Metformin HCl (Metformin HCl) 500 Mg Tablet, 500 MG PO DAILY, (Reported)





Allergies


Coded Allergies:  


     No Known Allergies (Verified  Allergy, Unknown, 6/14/20)











LEIGH ANN NAVA MD             Jun 18, 2020 16:21

## 2021-04-23 ENCOUNTER — HOSPITAL ENCOUNTER (OUTPATIENT)
Dept: HOSPITAL 53 - M SFHCCLAY | Age: 76
End: 2021-04-23
Attending: NURSE PRACTITIONER
Payer: MEDICARE

## 2021-04-23 DIAGNOSIS — E55.9: ICD-10-CM

## 2021-04-23 DIAGNOSIS — E78.5: ICD-10-CM

## 2021-04-23 DIAGNOSIS — E11.69: Primary | ICD-10-CM

## 2021-04-23 LAB
25(OH)D3 SERPL-MCNC: 20.6 NG/ML (ref 30–100)
ALBUMIN SERPL BCG-MCNC: 4 GM/DL (ref 3.2–5.2)
ALT SERPL W P-5'-P-CCNC: 17 U/L (ref 12–78)
BILIRUB SERPL-MCNC: 0.4 MG/DL (ref 0.2–1)
BUN SERPL-MCNC: 25 MG/DL (ref 7–18)
CALCIUM SERPL-MCNC: 9.3 MG/DL (ref 8.8–10.2)
CHLORIDE SERPL-SCNC: 107 MEQ/L (ref 98–107)
CHOLEST SERPL-MCNC: 273 MG/DL (ref ?–200)
CHOLEST/HDLC SERPL: 4.07 {RATIO} (ref ?–5)
CO2 SERPL-SCNC: 33 MEQ/L (ref 21–32)
CREAT SERPL-MCNC: 0.9 MG/DL (ref 0.55–1.3)
EST. AVERAGE GLUCOSE BLD GHB EST-MCNC: 151 MG/DL (ref 60–110)
GFR SERPL CREATININE-BSD FRML MDRD: > 60 ML/MIN/{1.73_M2} (ref 39–?)
GLUCOSE SERPL-MCNC: 171 MG/DL (ref 70–100)
HCT VFR BLD AUTO: 39.8 % (ref 36–47)
HDLC SERPL-MCNC: 67 MG/DL (ref 40–?)
HGB BLD-MCNC: 12.7 G/DL (ref 12–15.5)
LDLC SERPL CALC-MCNC: 171 MG/DL (ref ?–100)
MCH RBC QN AUTO: 30.2 PG (ref 27–33)
MCHC RBC AUTO-ENTMCNC: 31.9 G/DL (ref 32–36.5)
MCV RBC AUTO: 94.8 FL (ref 80–96)
NONHDLC SERPL-MCNC: 206 MG/DL
PLATELET # BLD AUTO: 192 10^3/UL (ref 150–450)
POTASSIUM SERPL-SCNC: 4.2 MEQ/L (ref 3.5–5.1)
PROT SERPL-MCNC: 7.6 GM/DL (ref 6.4–8.2)
RBC # BLD AUTO: 4.2 10^6/UL (ref 4–5.4)
SODIUM SERPL-SCNC: 142 MEQ/L (ref 136–145)
TRIGL SERPL-MCNC: 174 MG/DL (ref ?–150)
WBC # BLD AUTO: 9.7 10^3/UL (ref 4–10)

## 2021-04-23 PROCEDURE — 82306 VITAMIN D 25 HYDROXY: CPT

## 2021-04-23 PROCEDURE — 80053 COMPREHEN METABOLIC PANEL: CPT

## 2021-04-23 PROCEDURE — 85027 COMPLETE CBC AUTOMATED: CPT

## 2021-04-23 PROCEDURE — 83036 HEMOGLOBIN GLYCOSYLATED A1C: CPT

## 2021-04-23 PROCEDURE — 80061 LIPID PANEL: CPT

## 2021-07-14 ENCOUNTER — HOSPITAL ENCOUNTER (OUTPATIENT)
Dept: HOSPITAL 53 - M SFHCCLAY | Age: 76
End: 2021-07-14
Attending: NURSE PRACTITIONER
Payer: MEDICARE

## 2021-07-14 DIAGNOSIS — R39.9: Primary | ICD-10-CM

## 2021-07-14 LAB
APPEARANCE UR: (no result)
BACTERIA UR QL AUTO: NEGATIVE
BILIRUB UR QL STRIP.AUTO: NEGATIVE
GLUCOSE UR QL STRIP.AUTO: NEGATIVE MG/DL
HGB UR QL STRIP.AUTO: NEGATIVE
KETONES UR QL STRIP.AUTO: NEGATIVE MG/DL
LEUKOCYTE ESTERASE UR QL STRIP.AUTO: NEGATIVE
MUCOUS THREADS URNS QL MICRO: (no result)
NITRITE UR QL STRIP.AUTO: NEGATIVE
PH UR STRIP.AUTO: 6 UNITS (ref 5–9)
PROT UR QL STRIP.AUTO: (no result) MG/DL
RBC # UR AUTO: 0 /HPF (ref 0–3)
SP GR UR STRIP.AUTO: 1.02 (ref 1–1.03)
SQUAMOUS #/AREA URNS AUTO: 0 /HPF (ref 0–6)
UROBILINOGEN UR QL STRIP.AUTO: 2 MG/DL (ref 0–2)
WBC #/AREA URNS AUTO: 2 /HPF (ref 0–3)

## 2021-07-22 ENCOUNTER — HOSPITAL ENCOUNTER (OUTPATIENT)
Dept: HOSPITAL 53 - M SFHCCLAY | Age: 76
End: 2021-07-22
Attending: NURSE PRACTITIONER
Payer: MEDICARE

## 2021-07-22 DIAGNOSIS — R63.4: Primary | ICD-10-CM

## 2021-07-22 DIAGNOSIS — F03.91: ICD-10-CM

## 2021-07-22 DIAGNOSIS — I10: ICD-10-CM

## 2021-07-22 DIAGNOSIS — E11.69: ICD-10-CM

## 2021-07-22 PROCEDURE — 85025 COMPLETE CBC W/AUTO DIFF WBC: CPT

## 2021-07-22 PROCEDURE — 84443 ASSAY THYROID STIM HORMONE: CPT

## 2021-07-22 PROCEDURE — 80053 COMPREHEN METABOLIC PANEL: CPT

## 2021-07-22 PROCEDURE — 84439 ASSAY OF FREE THYROXINE: CPT

## 2021-07-22 PROCEDURE — 83036 HEMOGLOBIN GLYCOSYLATED A1C: CPT

## 2021-07-23 LAB
ALBUMIN SERPL BCG-MCNC: 3.8 GM/DL (ref 3.2–5.2)
ALT SERPL W P-5'-P-CCNC: 20 U/L (ref 12–78)
BASOPHILS # BLD AUTO: 0.1 10^3/UL (ref 0–0.2)
BASOPHILS NFR BLD AUTO: 0.8 % (ref 0–1)
BILIRUB SERPL-MCNC: 0.3 MG/DL (ref 0.2–1)
BUN SERPL-MCNC: 33 MG/DL (ref 7–18)
CALCIUM SERPL-MCNC: 9.7 MG/DL (ref 8.8–10.2)
CHLORIDE SERPL-SCNC: 109 MEQ/L (ref 98–107)
CO2 SERPL-SCNC: 30 MEQ/L (ref 21–32)
CREAT SERPL-MCNC: 1.19 MG/DL (ref 0.55–1.3)
EOSINOPHIL # BLD AUTO: 0.3 10^3/UL (ref 0–0.5)
EOSINOPHIL NFR BLD AUTO: 2.5 % (ref 0–3)
EST. AVERAGE GLUCOSE BLD GHB EST-MCNC: 154 MG/DL (ref 60–110)
GFR SERPL CREATININE-BSD FRML MDRD: 46.7 ML/MIN/{1.73_M2} (ref 39–?)
GLUCOSE SERPL-MCNC: 177 MG/DL (ref 70–100)
HCT VFR BLD AUTO: 37 % (ref 36–47)
HGB BLD-MCNC: 11.9 G/DL (ref 12–15.5)
LYMPHOCYTES # BLD AUTO: 4.9 10^3/UL (ref 1.5–5)
LYMPHOCYTES NFR BLD AUTO: 46.3 % (ref 24–44)
MCH RBC QN AUTO: 30.4 PG (ref 27–33)
MCHC RBC AUTO-ENTMCNC: 32.2 G/DL (ref 32–36.5)
MCV RBC AUTO: 94.4 FL (ref 80–96)
MONOCYTES # BLD AUTO: 0.6 10^3/UL (ref 0–0.8)
MONOCYTES NFR BLD AUTO: 5.6 % (ref 2–8)
NEUTROPHILS # BLD AUTO: 4.6 10^3/UL (ref 1.5–8.5)
NEUTROPHILS NFR BLD AUTO: 44.2 % (ref 36–66)
PLATELET # BLD AUTO: 290 10^3/UL (ref 150–450)
POTASSIUM SERPL-SCNC: 4.6 MEQ/L (ref 3.5–5.1)
PROT SERPL-MCNC: 7.4 GM/DL (ref 6.4–8.2)
RBC # BLD AUTO: 3.92 10^6/UL (ref 4–5.4)
SODIUM SERPL-SCNC: 144 MEQ/L (ref 136–145)
T4 FREE SERPL-MCNC: 1.04 NG/DL (ref 0.76–1.46)
TSH SERPL DL<=0.005 MIU/L-ACNC: 1.18 UIU/ML (ref 0.36–3.74)
WBC # BLD AUTO: 10.5 10^3/UL (ref 4–10)

## 2021-09-20 ENCOUNTER — HOSPITAL ENCOUNTER (INPATIENT)
Dept: HOSPITAL 53 - M ED | Age: 76
LOS: 22 days | DRG: 177 | End: 2021-10-12
Attending: INTERNAL MEDICINE | Admitting: INTERNAL MEDICINE
Payer: MEDICARE

## 2021-09-20 VITALS — WEIGHT: 157.63 LBS | BODY MASS INDEX: 29.01 KG/M2 | HEIGHT: 62 IN

## 2021-09-20 DIAGNOSIS — I48.0: ICD-10-CM

## 2021-09-20 DIAGNOSIS — U07.1: Primary | ICD-10-CM

## 2021-09-20 DIAGNOSIS — N18.30: ICD-10-CM

## 2021-09-20 DIAGNOSIS — J12.82: ICD-10-CM

## 2021-09-20 DIAGNOSIS — N17.9: ICD-10-CM

## 2021-09-20 DIAGNOSIS — F03.90: ICD-10-CM

## 2021-09-20 DIAGNOSIS — I13.0: ICD-10-CM

## 2021-09-20 DIAGNOSIS — Z87.891: ICD-10-CM

## 2021-09-20 DIAGNOSIS — I48.20: ICD-10-CM

## 2021-09-20 DIAGNOSIS — Z79.899: ICD-10-CM

## 2021-09-20 DIAGNOSIS — J96.01: ICD-10-CM

## 2021-09-20 DIAGNOSIS — I50.9: ICD-10-CM

## 2021-09-20 DIAGNOSIS — N39.0: ICD-10-CM

## 2021-09-20 LAB
ALBUMIN SERPL BCG-MCNC: 3.1 GM/DL (ref 3.2–5.2)
ALT SERPL W P-5'-P-CCNC: 22 U/L (ref 12–78)
BILIRUB CONJ SERPL-MCNC: 0.2 MG/DL (ref 0–0.2)
BILIRUB SERPL-MCNC: 0.4 MG/DL (ref 0.2–1)
BUN SERPL-MCNC: 48 MG/DL (ref 7–18)
CALCIUM SERPL-MCNC: 7.8 MG/DL (ref 8.8–10.2)
CHLORIDE SERPL-SCNC: 99 MEQ/L (ref 98–107)
CK MB CFR.DF SERPL CALC: 0.91
CK MB SERPL-MCNC: 2 NG/ML (ref ?–3.6)
CK SERPL-CCNC: 219 U/L (ref 26–192)
CO2 SERPL-SCNC: 24 MEQ/L (ref 21–32)
CREAT SERPL-MCNC: 2.17 MG/DL (ref 0.55–1.3)
CREAT UR-MCNC: 130 MG/DL
CRP SERPL-MCNC: 1.95 MG/DL (ref 0–0.3)
ERYTHROCYTE [SEDIMENTATION RATE] IN BLOOD BY WESTERGREN METHOD: 30 MM/HR (ref 0–30)
FERRITIN SERPL-MCNC: 1314 NG/ML (ref 8–252)
GFR SERPL CREATININE-BSD FRML MDRD: 23.3 ML/MIN/{1.73_M2} (ref 39–?)
GLUCOSE SERPL-MCNC: 211 MG/DL (ref 70–100)
HCT VFR BLD AUTO: 37.3 % (ref 36–47)
HGB BLD-MCNC: 12.5 G/DL (ref 12–15.5)
LDH SERPL L TO P-CCNC: 273 U/L (ref 84–246)
LYMPHOCYTES NFR BLD MANUAL: 41 % (ref 16–44)
MAGNESIUM SERPL-MCNC: 1.9 MG/DL (ref 1.8–2.4)
MCH RBC QN AUTO: 30.2 PG (ref 27–33)
MCHC RBC AUTO-ENTMCNC: 33.5 G/DL (ref 32–36.5)
MCV RBC AUTO: 90.1 FL (ref 80–96)
MONOCYTES NFR BLD MANUAL: 5 % (ref 0–5)
NEUTROPHILS NFR BLD MANUAL: 53 % (ref 28–66)
NT-PRO BNP: 1247 PG/ML (ref ?–450)
PLATELET # BLD AUTO: 97 10^3/UL (ref 150–450)
PLATELET BLD QL SMEAR: (no result)
POTASSIUM 24H UR-SCNC: 64.4 MEQ/L
POTASSIUM SERPL-SCNC: 3.9 MEQ/L (ref 3.5–5.1)
PROT SERPL-MCNC: 6.5 GM/DL (ref 6.4–8.2)
RBC # BLD AUTO: 4.14 10^6/UL (ref 4–5.4)
SODIUM SERPL-SCNC: 134 MEQ/L (ref 136–145)
SODIUM UR-SCNC: 43 MEQ/L
T4 FREE SERPL-MCNC: 1.48 NG/DL (ref 0.76–1.46)
TROPONIN I SERPL-MCNC: 0.05 NG/ML (ref ?–0.1)
TSH SERPL DL<=0.005 MIU/L-ACNC: 2.58 UIU/ML (ref 0.36–3.74)
WBC # BLD AUTO: 7.8 10^3/UL (ref 4–10)

## 2021-09-20 RX ADMIN — INSULIN LISPRO SCH UNITS: 100 INJECTION, SOLUTION INTRAVENOUS; SUBCUTANEOUS at 22:40

## 2021-09-20 NOTE — REP
INDICATION:

syncope.



COMPARISON:

06/14/2020.



TECHNIQUE:

Single portable AP view of the chest was performed.



FINDINGS:

Diffuse bilateral fibrotic changes appears similar to the prior study.  There is no

definite superimposed acute infiltrate.  Heart is upper limits of normal in size.

Mediastinal silhouette is unremarkable.  There are degenerative changes of the spine.



IMPRESSION:

No acute pulmonary disease.Stable chronic changes.





<Electronically signed by Erik Blanchard > 09/20/21 [id number]
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
## 2021-09-20 NOTE — REPVR
PROCEDURE INFORMATION: 

Exam: CT Head Without Contrast 

Exam date and time: 9/20/2021 7:03 PM 

Age: 76 years old 

Clinical indication: Pain; Headache; Additional info: Seizure 



TECHNIQUE: 

Imaging protocol: Computed tomography of the head without contrast. 

Radiation optimization: All CT scans at this facility use at least one of these 

dose optimization techniques: automated exposure control; mA and/or kV 

adjustment per patient size (includes targeted exams where dose is matched to 

clinical indication); or iterative reconstruction. 



COMPARISON: 

No relevant prior studies available. 



FINDINGS: 

Brain: There is no evidence of intracranial bleed. Gray-white differentiation 

appears preserved. There is mild prominence of the ventricles and cerebral 

sulci probably the result of mild atrophy. Gray-white differentiation appears 

preserved. There is no evidence of mass effect. 

Cerebral ventricles: No ventriculomegaly. 

Paranasal sinuses: Clear paranasal sinuses. 

Mastoid air cells: Clear mastoid air cells. 

Bones/joints: There is no evidence of fracture. 

Soft tissues: There is no evidence of soft tissue swelling. 



IMPRESSION: 

Mild atrophy. 



Electronically signed by: Rajan Mckenna On 09/20/2021  19:36:04 PM

## 2021-09-21 VITALS — DIASTOLIC BLOOD PRESSURE: 54 MMHG | SYSTOLIC BLOOD PRESSURE: 111 MMHG

## 2021-09-21 VITALS — DIASTOLIC BLOOD PRESSURE: 53 MMHG | SYSTOLIC BLOOD PRESSURE: 116 MMHG

## 2021-09-21 VITALS — DIASTOLIC BLOOD PRESSURE: 66 MMHG | SYSTOLIC BLOOD PRESSURE: 150 MMHG

## 2021-09-21 VITALS — OXYGEN SATURATION: 96 %

## 2021-09-21 VITALS — OXYGEN SATURATION: 93 %

## 2021-09-21 VITALS — OXYGEN SATURATION: 95 %

## 2021-09-21 VITALS — OXYGEN SATURATION: 88 %

## 2021-09-21 VITALS — SYSTOLIC BLOOD PRESSURE: 150 MMHG | DIASTOLIC BLOOD PRESSURE: 80 MMHG

## 2021-09-21 VITALS — OXYGEN SATURATION: 94 %

## 2021-09-21 VITALS — DIASTOLIC BLOOD PRESSURE: 58 MMHG | SYSTOLIC BLOOD PRESSURE: 115 MMHG

## 2021-09-21 VITALS — DIASTOLIC BLOOD PRESSURE: 81 MMHG | SYSTOLIC BLOOD PRESSURE: 105 MMHG

## 2021-09-21 LAB
APTT BLD: 30.9 SECONDS (ref 25.9–37)
BASOPHILS # BLD AUTO: 0 10^3/UL (ref 0–0.2)
BASOPHILS NFR BLD AUTO: 0.1 % (ref 0–1)
BUN SERPL-MCNC: 55 MG/DL (ref 7–18)
CALCIUM SERPL-MCNC: 8.3 MG/DL (ref 8.8–10.2)
CHLORIDE SERPL-SCNC: 102 MEQ/L (ref 98–107)
CO2 SERPL-SCNC: 23 MEQ/L (ref 21–32)
CREAT SERPL-MCNC: 1.58 MG/DL (ref 0.55–1.3)
D DIMER PPP DDU-MCNC: 2191.46 NG/ML (ref ?–500)
EOSINOPHIL # BLD AUTO: 0 10^3/UL (ref 0–0.5)
EOSINOPHIL NFR BLD AUTO: 0 % (ref 0–3)
FIBRINOGEN PPP-MCNC: 452 MG/DL (ref 268–480)
GFR SERPL CREATININE-BSD FRML MDRD: 33.7 ML/MIN/{1.73_M2} (ref 39–?)
GLUCOSE SERPL-MCNC: 228 MG/DL (ref 70–100)
HCT VFR BLD AUTO: 38.9 % (ref 36–47)
HGB BLD-MCNC: 13 G/DL (ref 12–15.5)
INR PPP: 1.04
LYMPHOCYTES # BLD AUTO: 3.2 10^3/UL (ref 1.5–5)
LYMPHOCYTES NFR BLD AUTO: 37.7 % (ref 24–44)
MAGNESIUM SERPL-MCNC: 1.9 MG/DL (ref 1.8–2.4)
MCH RBC QN AUTO: 30.2 PG (ref 27–33)
MCHC RBC AUTO-ENTMCNC: 33.4 G/DL (ref 32–36.5)
MCV RBC AUTO: 90.5 FL (ref 80–96)
MONOCYTES # BLD AUTO: 0.1 10^3/UL (ref 0–0.8)
MONOCYTES NFR BLD AUTO: 1.6 % (ref 2–8)
NEUTROPHILS # BLD AUTO: 5.1 10^3/UL (ref 1.5–8.5)
NEUTROPHILS NFR BLD AUTO: 60.1 % (ref 36–66)
PLATELET # BLD AUTO: (no result) 10^3/UL (ref 150–450)
POTASSIUM SERPL-SCNC: 4.2 MEQ/L (ref 3.5–5.1)
PROLACTIN SERPL-MCNC: 48 NG/ML
PROTHROMBIN TIME: 14 SECONDS (ref 12.7–14.5)
RBC # BLD AUTO: 4.3 10^6/UL (ref 4–5.4)
SODIUM SERPL-SCNC: 135 MEQ/L (ref 136–145)
WBC # BLD AUTO: 8.5 10^3/UL (ref 4–10)

## 2021-09-21 RX ADMIN — INSULIN LISPRO SCH UNITS: 100 INJECTION, SOLUTION INTRAVENOUS; SUBCUTANEOUS at 19:56

## 2021-09-21 RX ADMIN — SODIUM CHLORIDE, POTASSIUM CHLORIDE, SODIUM LACTATE AND CALCIUM CHLORIDE SCH MLS/HR: 600; 310; 30; 20 INJECTION, SOLUTION INTRAVENOUS at 17:19

## 2021-09-21 RX ADMIN — RISPERIDONE SCH MG: 0.5 TABLET ORAL at 20:03

## 2021-09-21 RX ADMIN — DEXAMETHASONE SODIUM PHOSPHATE SCH MG: 4 INJECTION, SOLUTION INTRAMUSCULAR; INTRAVENOUS at 08:26

## 2021-09-21 RX ADMIN — ASPIRIN SCH MG: 81 TABLET ORAL at 08:25

## 2021-09-21 RX ADMIN — INSULIN LISPRO SCH UNITS: 100 INJECTION, SOLUTION INTRAVENOUS; SUBCUTANEOUS at 12:44

## 2021-09-21 RX ADMIN — ENOXAPARIN SODIUM SCH MG: 30 INJECTION SUBCUTANEOUS at 08:25

## 2021-09-21 RX ADMIN — RISPERIDONE SCH MG: 0.5 TABLET ORAL at 02:11

## 2021-09-21 RX ADMIN — INSULIN LISPRO SCH UNITS: 100 INJECTION, SOLUTION INTRAVENOUS; SUBCUTANEOUS at 17:18

## 2021-09-21 RX ADMIN — SODIUM CHLORIDE, POTASSIUM CHLORIDE, SODIUM LACTATE AND CALCIUM CHLORIDE SCH MLS/HR: 600; 310; 30; 20 INJECTION, SOLUTION INTRAVENOUS at 03:06

## 2021-09-21 RX ADMIN — TAMSULOSIN HYDROCHLORIDE SCH MG: 0.4 CAPSULE ORAL at 17:45

## 2021-09-21 RX ADMIN — ESCITSLOPRAM SCH MG: 5 TABLET ORAL at 08:25

## 2021-09-21 RX ADMIN — INSULIN LISPRO SCH UNITS: 100 INJECTION, SOLUTION INTRAVENOUS; SUBCUTANEOUS at 08:27

## 2021-09-21 NOTE — IPNPDOC
Text Note


Date of Service


The patient was seen on 9/21/21.





NOTE


Subjective: Patient is a 76-year-old female who apparently fell and had a pos

sible seizure-like activity yesterday at home.  Patient was unable to provide 

much history however, I did call and speak with the patient's daughter who 

stated that over the past week the patient has been having worsening of her 

awareness of where she was and what time it was.  Patient has also been soiling 

herself which is not normal for her.  Patient came to the hospital after an 

event where the patient was sitting on her rolling walker and slumped over and 

fell to the floor and began shaking.  Patient urinated and defecated on herself.

 Apparently, neurology was consulted in the emergency department who stated that

is more likely syncopal event did not recommend any antiseizure medications.  

Patient has not had a seizure since being hospitalized.  Patient was diagnosed 

with COVID-19 and is currently on 6 L of oxygen.  Patient is otherwise doing 

well.





Review of systems:


Unable to be obtained due to patient's dementia











Physical exam:


Vitals: See below


General: Alert but not oriented female who was sitting up in bed who is very 

hard of hearing.  Patient did not complain of any pain and did not appear to be 

in any acute distress.  Patient had nasal cannula oxygen in place.


HEENT: Normocephalic, atraumatic, moist mucous membranes.


Neck: No lymphadenopathy or thyromegaly


Cardiac: Regular rate and rhythm, no murmurs, normal S1, normal S2


Pulm: Clear to auscultation bilaterally. No wheezes, rhonchi, rales


Abd: Nondistended, nontender to palpation, normal bowel sounds


Ext: No edema bilateral lower extremities





Labs: See below


Imaging: No new imaging has been performed








Assessment/plan:


76-year-old female with debility and unsteady gait with dementia who was 

diagnosed with acute hypoxic respiratory failure secondary to COVID-19 with GALLO 

on CKD 3 and syncopal seizure secondary to hypotension





1.  Acute hypoxic respiratory failure secondary to COVID-19.  Patient was starte

d on oxygen therapy and was requiring 6 L at the time of my evaluation..  We 

will attempt to wean this down.  Patient was started on remdesivir and 

dexamethasone.  We will continue to monitor.





2.  GALLO on CKD.  Likely combination due to ACE inhibitor and hydrochlorothiazide

just in with poor p.o. intake.  Patient is currently on lactated Ringer's at 60 

cc/h and her kidney function has improved.  We will continue to monitor.





3.  Syncopal seizure secondary to hypotension.  Continue with telemetry.  

Prolactin level was mildly elevated although this can be elevated both in 

seizures and syncopal events.  We will hold her BP medications.





4.  Dementia.  Continue with home medications.





DVT Prophylaxis: Lovenox





Disposition: Pending clinical improvement





VS,Thaddeus, I+O


VS, Thaddeus, I+O


Laboratory Tests


9/20/21 16:40








9/21/21 09:05











Vital Signs








  Date Time  Temp Pulse Resp B/P (MAP) Pulse Ox O2 Delivery O2 Flow Rate FiO2


 


9/21/21 12:47     94 Nasal Cannula 3.0 


 


9/21/21 07:51 97.8 83 16 150/66 (94)    














I&O- Last 24 Hours up to 6 AM 


 


 9/21/21





 06:00


 


Intake Total 1000 ml


 


Output Total 20 ml


 


Balance 980 ml

















ROGER CANDELARIO DO               Sep 21, 2021 12:52

## 2021-09-21 NOTE — ECGEPIP
[facility] - ED

                                       

                                       Test Date:    2021

Pat Name:     LISBETH BUCHANAN               Department:   

Patient ID:   [id number]                 Room:         [place] [address]

Gender:       Female                   Technician:   sayda

:          1945               Requested By: JOSE A CAZARES

Order Number: [id number]     Reading MD:   Katherine Bowser

                                 Measurements

Intervals                              Axis          

Rate:         74                       P:            

DC:                                    QRS:          -38

QRSD:         90                       T:            -5

QT:           402                                    

QTc:          446                                    

                           Interpretive Statements

Atrial fibrillation

Left axis deviation

Minimal voltage criteria for LVH, may be normal variant ( [place] product )

Septal infarct , age undetermined

6/15/20 sinus rhythm

Electronically Signed on 2021 13:52:23 EDT by Katherine Bowser

## 2021-09-22 VITALS — OXYGEN SATURATION: 92 %

## 2021-09-22 VITALS — DIASTOLIC BLOOD PRESSURE: 50 MMHG | SYSTOLIC BLOOD PRESSURE: 90 MMHG

## 2021-09-22 VITALS — OXYGEN SATURATION: 96 %

## 2021-09-22 VITALS — OXYGEN SATURATION: 88 %

## 2021-09-22 VITALS — DIASTOLIC BLOOD PRESSURE: 60 MMHG | SYSTOLIC BLOOD PRESSURE: 139 MMHG

## 2021-09-22 VITALS — OXYGEN SATURATION: 94 %

## 2021-09-22 VITALS — OXYGEN SATURATION: 93 %

## 2021-09-22 VITALS — DIASTOLIC BLOOD PRESSURE: 84 MMHG | SYSTOLIC BLOOD PRESSURE: 127 MMHG

## 2021-09-22 VITALS — DIASTOLIC BLOOD PRESSURE: 58 MMHG | SYSTOLIC BLOOD PRESSURE: 144 MMHG

## 2021-09-22 VITALS — OXYGEN SATURATION: 95 %

## 2021-09-22 VITALS — OXYGEN SATURATION: 90 %

## 2021-09-22 VITALS — OXYGEN SATURATION: 89 %

## 2021-09-22 VITALS — OXYGEN SATURATION: 87 %

## 2021-09-22 VITALS — SYSTOLIC BLOOD PRESSURE: 104 MMHG | DIASTOLIC BLOOD PRESSURE: 61 MMHG

## 2021-09-22 LAB
ALBUMIN SERPL BCG-MCNC: 2.6 GM/DL (ref 3.2–5.2)
ALT SERPL W P-5'-P-CCNC: 21 U/L (ref 12–78)
APTT BLD: 31.4 SECONDS (ref 25.9–37)
BILIRUB CONJ SERPL-MCNC: 0.1 MG/DL (ref 0–0.2)
BILIRUB SERPL-MCNC: 0.3 MG/DL (ref 0.2–1)
BUN SERPL-MCNC: 54 MG/DL (ref 7–18)
CALCIUM SERPL-MCNC: 8.7 MG/DL (ref 8.8–10.2)
CHLORIDE SERPL-SCNC: 106 MEQ/L (ref 98–107)
CK SERPL-CCNC: 116 U/L (ref 26–192)
CO2 SERPL-SCNC: 23 MEQ/L (ref 21–32)
CREAT SERPL-MCNC: 1.26 MG/DL (ref 0.55–1.3)
FERRITIN SERPL-MCNC: 1256 NG/ML (ref 8–252)
FIBRINOGEN PPP-MCNC: 421 MG/DL (ref 268–480)
GFR SERPL CREATININE-BSD FRML MDRD: 43.7 ML/MIN/{1.73_M2} (ref 39–?)
GLUCOSE SERPL-MCNC: 196 MG/DL (ref 70–100)
INR PPP: 0.95
LDH SERPL L TO P-CCNC: 291 U/L (ref 84–246)
NT-PRO BNP: 1970 PG/ML (ref ?–450)
POTASSIUM SERPL-SCNC: 4.1 MEQ/L (ref 3.5–5.1)
PROT SERPL-MCNC: 6.5 GM/DL (ref 6.4–8.2)
PROTHROMBIN TIME: 13.1 SECONDS (ref 12.7–14.5)
SODIUM SERPL-SCNC: 139 MEQ/L (ref 136–145)
TROPONIN I SERPL-MCNC: 0.02 NG/ML (ref ?–0.1)

## 2021-09-22 RX ADMIN — DEXTROSE MONOHYDRATE SCH MLS/HR: 5 INJECTION INTRAVENOUS at 23:52

## 2021-09-22 RX ADMIN — METOPROLOL TARTRATE SCH MG: 5 INJECTION INTRAVENOUS at 15:20

## 2021-09-22 RX ADMIN — INSULIN LISPRO SCH UNITS: 100 INJECTION, SOLUTION INTRAVENOUS; SUBCUTANEOUS at 18:12

## 2021-09-22 RX ADMIN — METOPROLOL TARTRATE SCH MG: 5 INJECTION INTRAVENOUS at 15:25

## 2021-09-22 RX ADMIN — ESCITSLOPRAM SCH MG: 5 TABLET ORAL at 09:16

## 2021-09-22 RX ADMIN — DEXAMETHASONE SODIUM PHOSPHATE SCH MG: 4 INJECTION, SOLUTION INTRAMUSCULAR; INTRAVENOUS at 09:15

## 2021-09-22 RX ADMIN — ENOXAPARIN SODIUM SCH MG: 30 INJECTION SUBCUTANEOUS at 09:27

## 2021-09-22 RX ADMIN — INSULIN LISPRO SCH UNITS: 100 INJECTION, SOLUTION INTRAVENOUS; SUBCUTANEOUS at 09:16

## 2021-09-22 RX ADMIN — ACETAMINOPHEN PRN MG: 325 TABLET ORAL at 20:10

## 2021-09-22 RX ADMIN — BARICITINIB SCH MG: 2 TABLET, FILM COATED ORAL at 18:13

## 2021-09-22 RX ADMIN — DEXTROSE MONOHYDRATE SCH MLS/HR: 5 INJECTION INTRAVENOUS at 18:12

## 2021-09-22 RX ADMIN — INSULIN LISPRO SCH UNITS: 100 INJECTION, SOLUTION INTRAVENOUS; SUBCUTANEOUS at 20:16

## 2021-09-22 RX ADMIN — TAMSULOSIN HYDROCHLORIDE SCH MG: 0.4 CAPSULE ORAL at 20:09

## 2021-09-22 RX ADMIN — METOPROLOL TARTRATE SCH MG: 5 INJECTION INTRAVENOUS at 15:30

## 2021-09-22 RX ADMIN — ASPIRIN SCH MG: 81 TABLET ORAL at 09:16

## 2021-09-22 RX ADMIN — INSULIN LISPRO SCH UNITS: 100 INJECTION, SOLUTION INTRAVENOUS; SUBCUTANEOUS at 13:11

## 2021-09-22 RX ADMIN — SODIUM CHLORIDE SCH ML: 9 INJECTION, SOLUTION INTRAMUSCULAR; INTRAVENOUS; SUBCUTANEOUS at 02:40

## 2021-09-22 RX ADMIN — ACETAMINOPHEN PRN MG: 325 TABLET ORAL at 14:58

## 2021-09-22 RX ADMIN — RISPERIDONE SCH MG: 0.5 TABLET ORAL at 20:09

## 2021-09-22 RX ADMIN — SODIUM CHLORIDE SCH MLS/HR: 9 INJECTION, SOLUTION INTRAVENOUS at 01:33

## 2021-09-22 NOTE — REP
INDICATION:

WORSENING SHORTNESS OF BREATH.



COMPARISON:

Comparison chest x-ray September 20, 2021.



TECHNIQUE:

Portable semi-erect AP radiograph.



FINDINGS:

There are patchy bilateral interstitial infiltrates in the left perihilar region, left

lower lobe region, right lower lobe region.  These are consistent with the history of

viral pneumonia.  The heart is enlarged.  No pleural effusion is seen.  Study is

otherwise unremarkable.



IMPRESSION:

Patchy bilateral interstitial infiltrates consistent with viral pneumonitis.





<Electronically signed by Raf Diallo > 09/22/21 [id number]

## 2021-09-22 NOTE — ECGEPIP
[facility]

                                       

                                       Test Date:    2021

Pat Name:     LISBETH BUCHANAN               Department:   

Patient ID:   [id number]                 Room:         [place] [address]

Gender:       Female                   Technician:   soren

:          1945               Requested By: ROGER CANDELARIO 

Order Number: [id number]     Reading MD:   Mirna Gil

                                 Measurements

Intervals                              Axis          

Rate:         99                       P:            

HI:                                    QRS:          -44

QRSD:         86                       T:            69

QT:           332                                    

QTc:          426                                    

                           Interpretive Statements

Atrial fibrillation

Left axis deviation

Septal infarct , age undetermined  WORSE R WAVE PROG C/W 21 RATE SLIGHTL

FASTER

NEW STTW ABN

Electronically Signed on 2021 16:12:56 EDT by Mirna Gil

## 2021-09-22 NOTE — IPNPDOC
Text Note


Date of Service


The patient was seen on 9/22/21.





NOTE


Subjective: Patient is a 76-year-old female apparently fell had possible 

seizure-like activity 2 days ago while at home.  Patient was diagnosed with 

COVID-19 and has been requiring oxygen therapy since being in the hospital.  

Patient has been hovering anywhere from 4 to 6 L of oxygen requirement.  Patient

is more alert today and is able to answer some questions.  Patient does not 

complain of any pain at this time.  Patient is still only aware of her name but 

cannot answer where she is or the date.  Patient is otherwise feeling well 

today.





Review of systems:


Unable to reliably obtain due to the patient's dementia however, patient did not

complain of any pain











Physical exam:


Vitals: See below


General: Alert and oriented x1 female who was laying in bed comfortably with 

nasal cannula oxygen in place when I walked in the room.  Patient not appear to 

be in any acute distress


HEENT: Normocephalic, atraumatic, moist mucous membranes.


Neck: No lymphadenopathy or thyromegaly


Cardiac: Regular rate and rhythm, no murmurs, normal S1, normal S2


Pulm: Diminished breath sounds bilaterally


Abd: Nondistended, nontender to palpation, normal bowel sounds


Ext: No edema bilateral lower extremities





Labs: See below


Imaging: No new imaging has been performed








Assessment/plan:


76-year-old female with debility and unsteady gait with dementia who was 

diagnosed with acute hypoxic respiratory failure secondary COVID-19 with GALLO on 

CKD and syncopal seizure secondary to hypotension





1.  Acute hypoxic respiratory failure secondary COVID-19.  Patient was started 

on oxygen therapy requiring 6 L at this time.  Patient's oxygen saturation was 

95% while on 6 L and will attempt to wean this down however, we have been 

unsuccessful getting past 4 L.  Patient has been on remdesivir and dexamethasone

we will continue to monitor.





2.  GALLO on CKD.  Likely due to combination of ACE inhibitor hydrochlorothiazide 

with poor p.o. intake.  Patient is currently on lactated Ringer's at 60 cc/h and

her kidney function is improving.  Continue to monitor.





3.  Syncopal seizure secondary to hypotension.  Continue with telemetry.  

Patient's blood pressure has been on the lower side of normal.  We will continue

to hold her home medications at this time.  I spoke with neurology yesterday who

did not recommend any further treatment with antiseizure medications as this was

the first seizure that the patient has ever had.  If she has another seizure, we

will begin therapy.





4.  Dementia.  Continue home medications.





DVT Prophylaxis: Lovenox





Disposition: Pending clinical improvement in oxygenation





Late entry: I was called by nursing staff at around 2:47 PM that the patient had

become acutely hypoxic and tachycardic.  I went down to evaluate the patient and

the patient was shivering.  Patient had a fever 101 F axillary.  Patient was 

initially given Tylenol.  On the monitor, the patient's heart rate was ranging 

anywhere from 110-140.  Patient was requiring max high flow nasal cannula 15 

L/min plus a nonrebreather mask in order to maintain her oxygen saturations 

greater than 90%.  On this, her oxygen saturations were around 100%.  The 

decision was made to add Vapotherm to the patient.  Patient was initially put on

Vapotherm and was stabilized.  I went out to contact the patient's daughter in 

order to give her an update about the patient's acute worsening when I noticed 

on the monitor the patient's heart rate had gone up to 180-200.  I had to hang 

up on the patient's daughter and go evaluate the patient.  Patient said she 

needed to urinate and was in the process of getting up to go to the commode.  

Patient was sitting on the commode when she had an event that lasted about 5 

seconds where she stiffened up and her eyes became wide and she was staring off 

into space.  After about 5 seconds she came back to.  She was able to grab my 

hand and squeeze my hand during this event.  After the patient voided she was 

placed back into the bed.  Patient's heart rate began to come down from [id number] 

to about 100.  The decision was made to hold the metoprolol IV that was ordered 

and go with p.o. metoprolol.  Patient's oxygen saturations were around 100% and 

the Vapotherm was able to be weaned down to 40 L/min and FiO2 of 95%.  Patient 

did have an episode when I came back to the floor 30 minutes later of hypoxia 

although her Vapotherm nasal cannula was not in her nose and was above her nose.

 Once the Vapotherm nasal cannula was replaced, patient's oxygen saturations 

improved to 98%.





I called the patient's daughter Paula ([phone]) who is her healthcare 

proxy to update her.  I did mention that we are starting broad-spectrum 

antibiotics including vancomycin and IV Zosyn, I repeated the patient's chest x-

ray which radiology reported as patchy bilateral interstitial infiltrates 

consistent with viral pneumonia.  I spoke with Dr. Lawrence of pulmonology and she

agreed to this start baricitinib.  Patient was upgraded to PCU status.  I did 

discuss with the patient's daughter about the possibility of intubation and 

patient's daughter did not give a definitive answer at this time and stated that

if we get to that point, please call the daughter before intubating the patient.

 Patient was left in stable condition on Vapotherm at the above settings.





VS,Fishbone, I+O


VS, Fishbone, I+O





Vital Signs








  Date Time  Temp Pulse Resp B/P (MAP) Pulse Ox O2 Delivery O2 Flow Rate FiO2


 


9/22/21 09:45     90 Nasal Cannula 6.0 


 


9/22/21 06:00 96.6 86 16 104/61 (75)    














I&O- Last 24 Hours up to 6 AM 


 


 9/22/21





 06:00


 


Intake Total 1320 ml


 


Output Total 830 ml


 


Balance 490 ml

















ROGER CANDELARIO DO               Sep 22, 2021 10:59

## 2021-09-23 VITALS — SYSTOLIC BLOOD PRESSURE: 140 MMHG | DIASTOLIC BLOOD PRESSURE: 63 MMHG

## 2021-09-23 VITALS — DIASTOLIC BLOOD PRESSURE: 50 MMHG | SYSTOLIC BLOOD PRESSURE: 96 MMHG

## 2021-09-23 VITALS — SYSTOLIC BLOOD PRESSURE: 98 MMHG | DIASTOLIC BLOOD PRESSURE: 44 MMHG

## 2021-09-23 VITALS — OXYGEN SATURATION: 94 %

## 2021-09-23 VITALS — SYSTOLIC BLOOD PRESSURE: 99 MMHG | DIASTOLIC BLOOD PRESSURE: 55 MMHG

## 2021-09-23 VITALS — SYSTOLIC BLOOD PRESSURE: 133 MMHG | DIASTOLIC BLOOD PRESSURE: 63 MMHG

## 2021-09-23 VITALS — SYSTOLIC BLOOD PRESSURE: 94 MMHG | DIASTOLIC BLOOD PRESSURE: 44 MMHG

## 2021-09-23 VITALS — OXYGEN SATURATION: 96 %

## 2021-09-23 LAB
BUN SERPL-MCNC: 66 MG/DL (ref 7–18)
CALCIUM SERPL-MCNC: 8.4 MG/DL (ref 8.8–10.2)
CHLORIDE SERPL-SCNC: 105 MEQ/L (ref 98–107)
CO2 SERPL-SCNC: 23 MEQ/L (ref 21–32)
CREAT SERPL-MCNC: 1.8 MG/DL (ref 0.55–1.3)
GFR SERPL CREATININE-BSD FRML MDRD: 29 ML/MIN/{1.73_M2} (ref 39–?)
GLUCOSE SERPL-MCNC: 149 MG/DL (ref 70–100)
HCT VFR BLD AUTO: 38.4 % (ref 36–47)
HGB BLD-MCNC: 12.7 G/DL (ref 12–15.5)
MAGNESIUM SERPL-MCNC: 1.6 MG/DL (ref 1.8–2.4)
MCH RBC QN AUTO: 30.2 PG (ref 27–33)
MCHC RBC AUTO-ENTMCNC: 33.1 G/DL (ref 32–36.5)
MCV RBC AUTO: 91.2 FL (ref 80–96)
PLATELET # BLD AUTO: 137 10^3/UL (ref 150–450)
POTASSIUM SERPL-SCNC: 4.2 MEQ/L (ref 3.5–5.1)
RBC # BLD AUTO: 4.21 10^6/UL (ref 4–5.4)
SODIUM SERPL-SCNC: 137 MEQ/L (ref 136–145)
WBC # BLD AUTO: 11.7 10^3/UL (ref 4–10)

## 2021-09-23 RX ADMIN — SODIUM CHLORIDE SCH MLS/HR: 9 INJECTION, SOLUTION INTRAVENOUS at 03:20

## 2021-09-23 RX ADMIN — DEXTROSE MONOHYDRATE SCH MLS/HR: 5 INJECTION INTRAVENOUS at 17:28

## 2021-09-23 RX ADMIN — BARICITINIB SCH MG: 2 TABLET, FILM COATED ORAL at 09:56

## 2021-09-23 RX ADMIN — RISPERIDONE SCH MG: 0.5 TABLET ORAL at 20:08

## 2021-09-23 RX ADMIN — INSULIN LISPRO SCH UNITS: 100 INJECTION, SOLUTION INTRAVENOUS; SUBCUTANEOUS at 13:11

## 2021-09-23 RX ADMIN — ACETAMINOPHEN PRN MG: 325 TABLET ORAL at 20:09

## 2021-09-23 RX ADMIN — DEXTROSE MONOHYDRATE SCH MLS/HR: 5 INJECTION INTRAVENOUS at 05:43

## 2021-09-23 RX ADMIN — INSULIN LISPRO SCH UNITS: 100 INJECTION, SOLUTION INTRAVENOUS; SUBCUTANEOUS at 09:51

## 2021-09-23 RX ADMIN — ASPIRIN SCH MG: 81 TABLET ORAL at 09:55

## 2021-09-23 RX ADMIN — ESCITSLOPRAM SCH MG: 5 TABLET ORAL at 09:56

## 2021-09-23 RX ADMIN — INSULIN LISPRO SCH UNITS: 100 INJECTION, SOLUTION INTRAVENOUS; SUBCUTANEOUS at 20:10

## 2021-09-23 RX ADMIN — DEXAMETHASONE SODIUM PHOSPHATE SCH MG: 4 INJECTION, SOLUTION INTRAMUSCULAR; INTRAVENOUS at 09:57

## 2021-09-23 RX ADMIN — INSULIN LISPRO SCH UNITS: 100 INJECTION, SOLUTION INTRAVENOUS; SUBCUTANEOUS at 17:29

## 2021-09-23 RX ADMIN — ENOXAPARIN SODIUM SCH MG: 30 INJECTION SUBCUTANEOUS at 09:57

## 2021-09-23 RX ADMIN — DEXTROSE MONOHYDRATE SCH MLS/HR: 50 INJECTION, SOLUTION INTRAVENOUS at 05:43

## 2021-09-23 RX ADMIN — SODIUM CHLORIDE SCH ML: 9 INJECTION, SOLUTION INTRAMUSCULAR; INTRAVENOUS; SUBCUTANEOUS at 03:19

## 2021-09-23 RX ADMIN — DEXTROSE MONOHYDRATE SCH MLS/HR: 5 INJECTION INTRAVENOUS at 13:12

## 2021-09-23 RX ADMIN — ACETAMINOPHEN PRN MG: 325 TABLET ORAL at 13:11

## 2021-09-23 RX ADMIN — TAMSULOSIN HYDROCHLORIDE SCH MG: 0.4 CAPSULE ORAL at 20:08

## 2021-09-23 NOTE — IPNPDOC
Text Note


Date of Service


The patient was seen on 9/23/21.





NOTE


Subjective: Patient is 74-year-old female who had a fall with seizure-like ac

tivity 3 days ago at home.  Patient was diagnosed with COVID-19 pneumonia 

requiring more oxygen.  Patient had an episode yesterday where she had A. fib 

with rapid ventricular response up to a heart rate of 200.  Patient was able to 

recover and was given 25 mg of p.o. metoprolol.  Patient's heart rate has not 

had any other issues at this time.  Patient says she is doing well.  Patient is 

still on Vapotherm requiring high flow nasal cannula oxygen but we are still 

continuing to wean this down.  Patient is otherwise feeling well.





Review of systems:


Unreliably able to obtain due to the patient's dementia however, patient does 

not complain of any pain at this time.











Physical exam:


Vitals: See below


General: Alert and oriented x1 female who is laying in bed comfortably with high

flow Vapotherm nasal cannula in place.  Patient did not appear to be in any 

acute distress.


HEENT: Normocephalic, atraumatic, moist mucous membranes.


Neck: No lymphadenopathy or thyromegaly


Cardiac: Irregularly irregular rhythm with a nontachycardic rate no murmurs, 

normal S1, normal S2


Pulm: Diminished breath sounds bilaterally.


Abd: Nondistended, nontender to palpation, normal bowel sounds


Ext: No edema bilateral lower extremities





Labs: See below


Imaging: Chest x-ray performed on 9/22/2021 was reported to show patchy 

bilateral interstitial infiltrates consistent with viral pneumonitis








Assessment/plan:


76-year-old female with debility and unsteady gait with dementia who was 

diagnosed with acute hypoxemic respiratory failure secondary to COVID-19 with 

GALLO on CKD and syncopal seizure secondary to hypotension.





1.  Acute hypoxemic respiratory failure secondary COVID-19.  Patient was 

requiring 6 L initially however, she acutely worsened yesterday and is now 

requiring Vapotherm.  At the time of my evaluation, we are able to wean the 

patient down to Vapotherm settings of 40 L/min and an FiO2 of 65%.  I instructed

nursing staff to attempt to continue to wean this.  Patient is on remdesivir and

dexamethasone.  I contacted pulmonary yesterday who agreed to starting 

baricitinib.  Patient started this yesterday.  We also have the patient on 

broad-spectrum antibiotics due to the acute worsening.  We will continue to 

monitor the patient's oxygen status.  I did update the patient's daughter this 

morning.





2.  GALLO on CKD.  Likely due to combination of ACE inhibitor and 

hydrochlorothiazide with poor p.o. intake.  This has since improved and we will 

continue to monitor.  Patient is off of IV fluids at this time.





3.  Syncopal seizure secondary to hypotension.  Continue with telemetry.





4.  Atrial fibrillation, chronic.  Patient had a run of atrial fibrillation with

rapid ventricular response with heart rate up into the 200s yesterday.  Patient 

was given 1 dose of p.o. metoprolol.  Patient's heart rate is now in the 70s but

is still showing atrial fibrillation on the monitor.  We will continue telemetry

at this time.  If the patient heart rate begins to climb, she will be placed on 

rate controlling therapy.  Patient is not on anticoagulation at home because 

according to the daughter, she could not afford it.





5.  Dementia.  Continue home medications.





DVT Prophylaxis: Lovenox





Disposition: Pending improvement of the patient's oxygenation.





VS,[place], I+O


VS, Fishbone, I+O


Laboratory Tests


9/23/21 06:43











Vital Signs








  Date Time  Temp Pulse Resp B/P (MAP) Pulse Ox O2 Delivery O2 Flow Rate FiO2


 


9/23/21 11:00  72   96 HVNI-Vapotherm 40.0 60


 


9/23/21 08:00 98.7  18 99/55 (70)    














I&O- Last 24 Hours up to 6 AM 


 


 9/23/21





 06:00


 


Intake Total 670 ml


 


Output Total 400 ml


 


Balance 270 ml

















ROGER CANDELARIO DO               Sep 23, 2021 12:10

## 2021-09-24 VITALS — OXYGEN SATURATION: 94 % | SYSTOLIC BLOOD PRESSURE: 153 MMHG | DIASTOLIC BLOOD PRESSURE: 72 MMHG

## 2021-09-24 VITALS — SYSTOLIC BLOOD PRESSURE: 113 MMHG | DIASTOLIC BLOOD PRESSURE: 65 MMHG

## 2021-09-24 VITALS — OXYGEN SATURATION: 93 %

## 2021-09-24 VITALS — OXYGEN SATURATION: 92 % | SYSTOLIC BLOOD PRESSURE: 135 MMHG | DIASTOLIC BLOOD PRESSURE: 84 MMHG

## 2021-09-24 VITALS — SYSTOLIC BLOOD PRESSURE: 146 MMHG | DIASTOLIC BLOOD PRESSURE: 89 MMHG | OXYGEN SATURATION: 90 %

## 2021-09-24 VITALS — OXYGEN SATURATION: 92 %

## 2021-09-24 VITALS — DIASTOLIC BLOOD PRESSURE: 74 MMHG | SYSTOLIC BLOOD PRESSURE: 122 MMHG

## 2021-09-24 VITALS — DIASTOLIC BLOOD PRESSURE: 93 MMHG | SYSTOLIC BLOOD PRESSURE: 124 MMHG | OXYGEN SATURATION: 93 %

## 2021-09-24 LAB
ALBUMIN SERPL BCG-MCNC: 2.1 GM/DL (ref 3.2–5.2)
ALT SERPL W P-5'-P-CCNC: 17 U/L (ref 12–78)
APTT BLD: 39 SECONDS (ref 25.9–37)
BILIRUB CONJ SERPL-MCNC: 0.2 MG/DL (ref 0–0.2)
BILIRUB SERPL-MCNC: 0.4 MG/DL (ref 0.2–1)
BUN SERPL-MCNC: 67 MG/DL (ref 7–18)
BUN SERPL-MCNC: 72 MG/DL (ref 7–18)
CALCIUM SERPL-MCNC: 8.4 MG/DL (ref 8.8–10.2)
CALCIUM SERPL-MCNC: 8.4 MG/DL (ref 8.8–10.2)
CHLORIDE SERPL-SCNC: 107 MEQ/L (ref 98–107)
CHLORIDE SERPL-SCNC: 109 MEQ/L (ref 98–107)
CK SERPL-CCNC: 92 U/L (ref 26–192)
CO2 SERPL-SCNC: 24 MEQ/L (ref 21–32)
CO2 SERPL-SCNC: 26 MEQ/L (ref 21–32)
CREAT SERPL-MCNC: 1.76 MG/DL (ref 0.55–1.3)
CREAT SERPL-MCNC: 2.01 MG/DL (ref 0.55–1.3)
FERRITIN SERPL-MCNC: 1521 NG/ML (ref 8–252)
FIBRINOGEN PPP-MCNC: 509 MG/DL (ref 268–480)
GFR SERPL CREATININE-BSD FRML MDRD: 25.5 ML/MIN/{1.73_M2} (ref 39–?)
GFR SERPL CREATININE-BSD FRML MDRD: 29.7 ML/MIN/{1.73_M2} (ref 39–?)
GLUCOSE SERPL-MCNC: 232 MG/DL (ref 70–100)
GLUCOSE SERPL-MCNC: 294 MG/DL (ref 70–100)
HCT VFR BLD AUTO: 36.1 % (ref 36–47)
HGB BLD-MCNC: 11.8 G/DL (ref 12–15.5)
INR PPP: 1.32
LDH SERPL L TO P-CCNC: 371 U/L (ref 84–246)
MAGNESIUM SERPL-MCNC: 1.8 MG/DL (ref 1.8–2.4)
MCH RBC QN AUTO: 29.9 PG (ref 27–33)
MCHC RBC AUTO-ENTMCNC: 32.7 G/DL (ref 32–36.5)
MCV RBC AUTO: 91.4 FL (ref 80–96)
NT-PRO BNP: 4002 PG/ML (ref ?–450)
PLATELET # BLD AUTO: 169 10^3/UL (ref 150–450)
POTASSIUM SERPL-SCNC: 4.5 MEQ/L (ref 3.5–5.1)
POTASSIUM SERPL-SCNC: 4.7 MEQ/L (ref 3.5–5.1)
PROT SERPL-MCNC: 5.8 GM/DL (ref 6.4–8.2)
PROTHROMBIN TIME: 16.8 SECONDS (ref 12.7–14.5)
RBC # BLD AUTO: 3.95 10^6/UL (ref 4–5.4)
SODIUM SERPL-SCNC: 139 MEQ/L (ref 136–145)
SODIUM SERPL-SCNC: 140 MEQ/L (ref 136–145)
TROPONIN I SERPL-MCNC: < 0.02 NG/ML (ref ?–0.1)
WBC # BLD AUTO: 13.9 10^3/UL (ref 4–10)

## 2021-09-24 RX ADMIN — ASPIRIN SCH MG: 81 TABLET ORAL at 08:32

## 2021-09-24 RX ADMIN — INSULIN LISPRO SCH UNITS: 100 INJECTION, SOLUTION INTRAVENOUS; SUBCUTANEOUS at 08:33

## 2021-09-24 RX ADMIN — BARICITINIB SCH MG: 2 TABLET, FILM COATED ORAL at 08:32

## 2021-09-24 RX ADMIN — SODIUM CHLORIDE SCH MLS/HR: 9 INJECTION, SOLUTION INTRAVENOUS at 02:45

## 2021-09-24 RX ADMIN — INSULIN LISPRO SCH UNITS: 100 INJECTION, SOLUTION INTRAVENOUS; SUBCUTANEOUS at 20:16

## 2021-09-24 RX ADMIN — DEXTROSE MONOHYDRATE SCH MLS/HR: 5 INJECTION INTRAVENOUS at 06:28

## 2021-09-24 RX ADMIN — INSULIN LISPRO SCH UNITS: 100 INJECTION, SOLUTION INTRAVENOUS; SUBCUTANEOUS at 17:13

## 2021-09-24 RX ADMIN — DEXAMETHASONE SODIUM PHOSPHATE SCH MG: 4 INJECTION, SOLUTION INTRAMUSCULAR; INTRAVENOUS at 08:33

## 2021-09-24 RX ADMIN — SODIUM CHLORIDE, POTASSIUM CHLORIDE, SODIUM LACTATE AND CALCIUM CHLORIDE SCH MLS/HR: 600; 310; 30; 20 INJECTION, SOLUTION INTRAVENOUS at 17:55

## 2021-09-24 RX ADMIN — RISPERIDONE SCH MG: 0.5 TABLET ORAL at 20:16

## 2021-09-24 RX ADMIN — INSULIN LISPRO SCH UNITS: 100 INJECTION, SOLUTION INTRAVENOUS; SUBCUTANEOUS at 12:13

## 2021-09-24 RX ADMIN — ENOXAPARIN SODIUM SCH MG: 30 INJECTION SUBCUTANEOUS at 08:32

## 2021-09-24 RX ADMIN — SODIUM CHLORIDE SCH ML: 9 INJECTION, SOLUTION INTRAMUSCULAR; INTRAVENOUS; SUBCUTANEOUS at 02:45

## 2021-09-24 RX ADMIN — SODIUM CHLORIDE, POTASSIUM CHLORIDE, SODIUM LACTATE AND CALCIUM CHLORIDE SCH MLS/HR: 600; 310; 30; 20 INJECTION, SOLUTION INTRAVENOUS at 08:33

## 2021-09-24 RX ADMIN — ESCITSLOPRAM SCH MG: 5 TABLET ORAL at 08:32

## 2021-09-24 RX ADMIN — DEXTROSE MONOHYDRATE SCH MLS/HR: 5 INJECTION INTRAVENOUS at 00:05

## 2021-09-24 RX ADMIN — DEXTROSE MONOHYDRATE SCH MLS/HR: 5 INJECTION INTRAVENOUS at 17:12

## 2021-09-24 RX ADMIN — TAMSULOSIN HYDROCHLORIDE SCH MG: 0.4 CAPSULE ORAL at 20:16

## 2021-09-24 RX ADMIN — DEXTROSE MONOHYDRATE SCH MLS/HR: 5 INJECTION INTRAVENOUS at 12:12

## 2021-09-24 RX ADMIN — DEXTROSE MONOHYDRATE SCH MLS/HR: 50 INJECTION, SOLUTION INTRAVENOUS at 06:28

## 2021-09-24 NOTE — IPNPDOC
Text Note


Date of Service


The patient was seen on 9/24/21.





NOTE


Subjective: Patient is a 74-year-old female who had a fall seizure like activity

with 4 days ago at home.  Patient was diagnosed with COVID-19 pneumonia 

requiring more oxygen.  Patient had an episode 2 days ago where she had A. fib 

with a very rapid ventricular response after a heart rate of 200.  Patient has 

not had any episodes since then.  Patient states she is feeling well today.  

Patient's creatinine has increased over the past 2 days.  Patient was on 

vancomycin which is since been discontinued.  Patient states that she is feeling

otherwise well does not have any complaints today.





Review of systems:


Unable to reliably obtain due to the patient's dementia however, patient does 

not complain of any pain at this time.











Physical exam:


Vitals: See below


General: Alert but not oriented female who was laying in bed comfortably with 

high flow Vapotherm nasal cannula in place.  When I would asked what the 

patient's name when she said I am good multiple times.  Patient did not appear 

to be in any acute distress.


HEENT: Normocephalic, atraumatic, moist mucous membranes.


Neck: No lymphadenopathy or thyromegaly


Cardiac: Irregularly irregular rhythm with a nontachycardic rate, no murmurs, 

normal S1, normal S2


Pulm: Diminished breath sounds bilaterally


Abd: Nondistended, nontender to palpation, normal bowel sounds


Ext: No edema bilateral lower extremities





Labs: See below


Imaging: No new imaging has been performed








Assessment/plan:


76-year-old female with debility and unsteady gait with dementia who was 

diagnosed with acute hypoxic respiratory failure secondary to COVID-19 with 

acute kidney injury on CKD and syncopal seizure secondary to hypotension.





1.  Acute hypoxemic respiratory failure secondary COVID-19.  Patient was 

requiring 6 L but had to be switched over to Vapotherm 2 days ago.  We are 

weaning down the patient's Vapotherm.  Patient is currently on 20 L/min with an 

FiO2 of 40%.  Patient's procalcitonin has become elevated.  Patient did have a 

urinary tract infection but we will continue to treat for possible bacterial 

superimposed pneumonia.





2.  GALLO on CKD.  This did improve initially with fluid resuscitation but has 

since worsened.  Patient will be given a 1 L of lactated Ringer's and we will 

recheck her creatinine later on today.  If she is fluid responsive, will need to

push more oral fluids with nursing staff.  GALLO is mildly improving.  We will 

give an additional 1 L lactated Ringer's overnight and recheck in the morning.





3.  COVID-19.  Patient is currently on baricitinib, remdesivir, and Decadron.  

We will continue to wean oxygen as above.





4.  Syncopal seizure secondary to hypotension.  Continue with telemetry.





5.  Chronic atrial fibrillation.  Patient had a run of very rapid ventricular 

response 2 days ago.  Patient was given 1 dose of p.o. metoprolol.  Patient's 

heart rate has not gone up above 100 without any rate control.  We will continue

to monitor the patient's heart rate.





6.  Dementia.  Continue home medications.





DVT Prophylaxis: Lovenox





Disposition: Pending improvement patient's oxygenation.





VS,Fishbone, I+O


VS, Fishbone, I+O


Laboratory Tests


9/24/21 05:33











Vital Signs








  Date Time  Temp Pulse Resp B/P (MAP) Pulse Ox O2 Delivery O2 Flow Rate FiO2


 


9/24/21 08:00 96.1 88 20 135/84 (101) 92 HVNI-Vapotherm 30.0 45














I&O- Last 24 Hours up to 6 AM 


 


 9/24/21





 06:00


 


Intake Total 780 ml


 


Output Total 450 ml


 


Balance 330 ml

















ROGER CANDELARIO DO               Sep 24, 2021 12:01

## 2021-09-25 VITALS — SYSTOLIC BLOOD PRESSURE: 140 MMHG | DIASTOLIC BLOOD PRESSURE: 85 MMHG

## 2021-09-25 VITALS — OXYGEN SATURATION: 95 % | SYSTOLIC BLOOD PRESSURE: 163 MMHG | DIASTOLIC BLOOD PRESSURE: 87 MMHG

## 2021-09-25 VITALS — DIASTOLIC BLOOD PRESSURE: 81 MMHG | SYSTOLIC BLOOD PRESSURE: 157 MMHG

## 2021-09-25 VITALS — OXYGEN SATURATION: 90 % | SYSTOLIC BLOOD PRESSURE: 142 MMHG | DIASTOLIC BLOOD PRESSURE: 20 MMHG

## 2021-09-25 VITALS — SYSTOLIC BLOOD PRESSURE: 139 MMHG | DIASTOLIC BLOOD PRESSURE: 98 MMHG

## 2021-09-25 VITALS — DIASTOLIC BLOOD PRESSURE: 73 MMHG | SYSTOLIC BLOOD PRESSURE: 126 MMHG

## 2021-09-25 VITALS — SYSTOLIC BLOOD PRESSURE: 144 MMHG | DIASTOLIC BLOOD PRESSURE: 78 MMHG | OXYGEN SATURATION: 92 %

## 2021-09-25 VITALS — OXYGEN SATURATION: 92 %

## 2021-09-25 LAB
BUN SERPL-MCNC: 60 MG/DL (ref 7–18)
CALCIUM SERPL-MCNC: 8.4 MG/DL (ref 8.8–10.2)
CHLORIDE SERPL-SCNC: 109 MEQ/L (ref 98–107)
CO2 SERPL-SCNC: 25 MEQ/L (ref 21–32)
CREAT SERPL-MCNC: 1.57 MG/DL (ref 0.55–1.3)
GFR SERPL CREATININE-BSD FRML MDRD: 33.9 ML/MIN/{1.73_M2} (ref 39–?)
GLUCOSE SERPL-MCNC: 243 MG/DL (ref 70–100)
HCT VFR BLD AUTO: 35.9 % (ref 36–47)
HGB BLD-MCNC: 12.3 G/DL (ref 12–15.5)
MAGNESIUM SERPL-MCNC: 1.7 MG/DL (ref 1.8–2.4)
MCH RBC QN AUTO: 30.3 PG (ref 27–33)
MCHC RBC AUTO-ENTMCNC: 34.3 G/DL (ref 32–36.5)
MCV RBC AUTO: 88.4 FL (ref 80–96)
PLATELET # BLD AUTO: 211 10^3/UL (ref 150–450)
POTASSIUM SERPL-SCNC: 4.5 MEQ/L (ref 3.5–5.1)
RBC # BLD AUTO: 4.06 10^6/UL (ref 4–5.4)
SODIUM SERPL-SCNC: 140 MEQ/L (ref 136–145)
WBC # BLD AUTO: 13.7 10^3/UL (ref 4–10)

## 2021-09-25 RX ADMIN — DEXTROSE MONOHYDRATE SCH MLS/HR: 5 INJECTION INTRAVENOUS at 12:52

## 2021-09-25 RX ADMIN — FUROSEMIDE SCH MG: 10 INJECTION, SOLUTION INTRAMUSCULAR; INTRAVENOUS at 08:48

## 2021-09-25 RX ADMIN — DEXTROSE MONOHYDRATE SCH MLS/HR: 5 INJECTION INTRAVENOUS at 22:03

## 2021-09-25 RX ADMIN — SODIUM CHLORIDE SCH ML: 9 INJECTION, SOLUTION INTRAMUSCULAR; INTRAVENOUS; SUBCUTANEOUS at 02:27

## 2021-09-25 RX ADMIN — TAMSULOSIN HYDROCHLORIDE SCH MG: 0.4 CAPSULE ORAL at 22:02

## 2021-09-25 RX ADMIN — DEXTROSE MONOHYDRATE SCH MLS/HR: 5 INJECTION INTRAVENOUS at 00:05

## 2021-09-25 RX ADMIN — APIXABAN SCH MG: 5 TABLET, FILM COATED ORAL at 22:02

## 2021-09-25 RX ADMIN — INSULIN LISPRO SCH UNITS: 100 INJECTION, SOLUTION INTRAVENOUS; SUBCUTANEOUS at 07:30

## 2021-09-25 RX ADMIN — RISPERIDONE SCH MG: 0.5 TABLET ORAL at 22:02

## 2021-09-25 RX ADMIN — DEXAMETHASONE SODIUM PHOSPHATE SCH MG: 4 INJECTION, SOLUTION INTRAMUSCULAR; INTRAVENOUS at 08:48

## 2021-09-25 RX ADMIN — SODIUM CHLORIDE SCH MLS/HR: 9 INJECTION, SOLUTION INTRAVENOUS at 02:27

## 2021-09-25 RX ADMIN — ENOXAPARIN SODIUM SCH MG: 30 INJECTION SUBCUTANEOUS at 08:48

## 2021-09-25 RX ADMIN — INSULIN LISPRO SCH UNITS: 100 INJECTION, SOLUTION INTRAVENOUS; SUBCUTANEOUS at 12:52

## 2021-09-25 RX ADMIN — INSULIN LISPRO SCH UNITS: 100 INJECTION, SOLUTION INTRAVENOUS; SUBCUTANEOUS at 17:30

## 2021-09-25 RX ADMIN — INSULIN LISPRO SCH UNITS: 100 INJECTION, SOLUTION INTRAVENOUS; SUBCUTANEOUS at 22:14

## 2021-09-25 RX ADMIN — BARICITINIB SCH MG: 2 TABLET, FILM COATED ORAL at 08:47

## 2021-09-25 RX ADMIN — CEFTRIAXONE SCH MLS/HR: 2 INJECTION, POWDER, FOR SOLUTION INTRAMUSCULAR; INTRAVENOUS at 18:00

## 2021-09-25 RX ADMIN — ASPIRIN SCH MG: 81 TABLET ORAL at 08:45

## 2021-09-25 RX ADMIN — ESCITSLOPRAM SCH MG: 5 TABLET ORAL at 08:46

## 2021-09-25 RX ADMIN — DEXTROSE MONOHYDRATE SCH MLS/HR: 5 INJECTION INTRAVENOUS at 05:59

## 2021-09-25 NOTE — IPNPDOC
Text Note


Date of Service


The patient was seen on 9/25/21.





NOTE


Subjective: No new complaints overnight.  No fever or chills reported





                                        


Objective:


GENERAL APPEARANCE: NAD


HEENT: no scleral icterus, plus JVD, EOMI


CARDIOVASCULAR: S1S2


LUNGS: Diminished lung sounds bilaterally


ABDOMEN: soft & not tender w palpation


MUSCULOSKELETAL: no cyanosis, +1 swelling of lower extremities


INTEGUMENT: no generalized pallor


NEUROLOGICAL: cranial nerve function from 2-12 intact, follows commands





Assessment and plan





Patient is 74 years old female with past medical history of dementia who 

presented to the hospital with increased shortness of breath and atrial 

fibrillation with rapid ventricular rate.  Patient was found to have Covid 

positive pneumonia.





Acute hypoxemic respiratory failure/ARDS/COVID-19 pneumonia


Secondary to COVID-19 pneumonia


I changed Zosyn IV to ceftriaxone IV and doxycycline IV day 2 of antibiotic 

therapy


Procalcitonin was elevated to 3.1


Will continue remdesivir, Decadron and baricitinib


Incentive spirometry


Patient has advanced dementia and she refused to be prone.





Acute on chronic kidney failure


Improved


Secondary to volume constriction


Continue to monitor





Acute CHF


BNP significantly elevated to 4000


Will start Lasix IV 40 mg daily


I's and O's


Cardiac diet








Dementia


Follow-up with neurologist in the outpatient settings





Syncope


Most likely secondary to hypotension secondary to viral pneumonia





Chronic atrial fibrillation


Heart rate under control


Continue beta-blockers


I will start Eliquis 5 mg twice daily





DVT prophylaxis with Eliquis





VS,Fishbone, I+O


VS, Fishbone, I+O


Laboratory Tests


9/24/21 16:13








9/25/21 06:14











Vital Signs








  Date Time  Temp Pulse Resp B/P (MAP) Pulse Ox O2 Delivery O2 Flow Rate FiO2


 


9/25/21 12:00 96.5 92 20 126/73 (90) 93 HVNI-Vapotherm 15.0 50














I&O- Last 24 Hours up to 6 AM 


 


 9/25/21





 05:59


 


Intake Total 1900 ml


 


Output Total 800 ml


 


Balance 1100 ml

















ELINA MCADAMS DO            Sep 25, 2021 14:48

## 2021-09-26 VITALS — DIASTOLIC BLOOD PRESSURE: 77 MMHG | SYSTOLIC BLOOD PRESSURE: 147 MMHG | OXYGEN SATURATION: 92 %

## 2021-09-26 VITALS — SYSTOLIC BLOOD PRESSURE: 157 MMHG | OXYGEN SATURATION: 95 % | DIASTOLIC BLOOD PRESSURE: 86 MMHG

## 2021-09-26 VITALS — OXYGEN SATURATION: 94 % | SYSTOLIC BLOOD PRESSURE: 142 MMHG | DIASTOLIC BLOOD PRESSURE: 92 MMHG

## 2021-09-26 VITALS — OXYGEN SATURATION: 92 % | SYSTOLIC BLOOD PRESSURE: 140 MMHG | DIASTOLIC BLOOD PRESSURE: 80 MMHG

## 2021-09-26 VITALS — OXYGEN SATURATION: 93 % | SYSTOLIC BLOOD PRESSURE: 157 MMHG | DIASTOLIC BLOOD PRESSURE: 89 MMHG

## 2021-09-26 VITALS — SYSTOLIC BLOOD PRESSURE: 148 MMHG | DIASTOLIC BLOOD PRESSURE: 70 MMHG

## 2021-09-26 VITALS — DIASTOLIC BLOOD PRESSURE: 113 MMHG | SYSTOLIC BLOOD PRESSURE: 200 MMHG

## 2021-09-26 VITALS — SYSTOLIC BLOOD PRESSURE: 154 MMHG | DIASTOLIC BLOOD PRESSURE: 74 MMHG

## 2021-09-26 VITALS — OXYGEN SATURATION: 97 %

## 2021-09-26 VITALS — OXYGEN SATURATION: 93 %

## 2021-09-26 LAB
ALBUMIN SERPL BCG-MCNC: 2.4 GM/DL (ref 3.2–5.2)
ALT SERPL W P-5'-P-CCNC: 18 U/L (ref 12–78)
APTT BLD: 31 SECONDS (ref 25.9–37)
BILIRUB CONJ SERPL-MCNC: 0.1 MG/DL (ref 0–0.2)
BILIRUB SERPL-MCNC: 0.4 MG/DL (ref 0.2–1)
BUN SERPL-MCNC: 67 MG/DL (ref 7–18)
CALCIUM SERPL-MCNC: 8.6 MG/DL (ref 8.8–10.2)
CHLORIDE SERPL-SCNC: 107 MEQ/L (ref 98–107)
CK SERPL-CCNC: 32 U/L (ref 26–192)
CO2 SERPL-SCNC: 27 MEQ/L (ref 21–32)
CREAT SERPL-MCNC: 1.68 MG/DL (ref 0.55–1.3)
FERRITIN SERPL-MCNC: 1444 NG/ML (ref 8–252)
FIBRINOGEN PPP-MCNC: 585 MG/DL (ref 268–480)
GFR SERPL CREATININE-BSD FRML MDRD: 31.4 ML/MIN/{1.73_M2} (ref 39–?)
GLUCOSE SERPL-MCNC: 227 MG/DL (ref 70–100)
HCT VFR BLD AUTO: 38.8 % (ref 36–47)
HGB BLD-MCNC: 13.3 G/DL (ref 12–15.5)
INR PPP: 1.24
LDH SERPL L TO P-CCNC: 387 U/L (ref 84–246)
MAGNESIUM SERPL-MCNC: 1.8 MG/DL (ref 1.8–2.4)
MCH RBC QN AUTO: 30.3 PG (ref 27–33)
MCHC RBC AUTO-ENTMCNC: 34.3 G/DL (ref 32–36.5)
MCV RBC AUTO: 88.4 FL (ref 80–96)
NT-PRO BNP: 4015 PG/ML (ref ?–450)
PLATELET # BLD AUTO: 255 10^3/UL (ref 150–450)
POTASSIUM SERPL-SCNC: 4.2 MEQ/L (ref 3.5–5.1)
PROT SERPL-MCNC: 6 GM/DL (ref 6.4–8.2)
PROTHROMBIN TIME: 16 SECONDS (ref 12.7–14.5)
RBC # BLD AUTO: 4.39 10^6/UL (ref 4–5.4)
SODIUM SERPL-SCNC: 143 MEQ/L (ref 136–145)
TROPONIN I SERPL-MCNC: < 0.02 NG/ML (ref ?–0.1)
WBC # BLD AUTO: 14.5 10^3/UL (ref 4–10)

## 2021-09-26 RX ADMIN — INSULIN LISPRO SCH UNITS: 100 INJECTION, SOLUTION INTRAVENOUS; SUBCUTANEOUS at 17:39

## 2021-09-26 RX ADMIN — ASPIRIN SCH MG: 81 TABLET ORAL at 08:51

## 2021-09-26 RX ADMIN — DEXTROSE MONOHYDRATE SCH MLS/HR: 5 INJECTION INTRAVENOUS at 18:32

## 2021-09-26 RX ADMIN — DEXTROSE MONOHYDRATE SCH MLS/HR: 5 INJECTION INTRAVENOUS at 06:46

## 2021-09-26 RX ADMIN — INSULIN LISPRO SCH UNITS: 100 INJECTION, SOLUTION INTRAVENOUS; SUBCUTANEOUS at 20:58

## 2021-09-26 RX ADMIN — ESCITSLOPRAM SCH MG: 5 TABLET ORAL at 08:52

## 2021-09-26 RX ADMIN — INSULIN LISPRO SCH UNITS: 100 INJECTION, SOLUTION INTRAVENOUS; SUBCUTANEOUS at 08:50

## 2021-09-26 RX ADMIN — INSULIN LISPRO SCH UNITS: 100 INJECTION, SOLUTION INTRAVENOUS; SUBCUTANEOUS at 12:22

## 2021-09-26 RX ADMIN — APIXABAN SCH MG: 5 TABLET, FILM COATED ORAL at 20:57

## 2021-09-26 RX ADMIN — FUROSEMIDE SCH MG: 10 INJECTION, SOLUTION INTRAMUSCULAR; INTRAVENOUS at 08:50

## 2021-09-26 RX ADMIN — RISPERIDONE SCH MG: 0.5 TABLET ORAL at 20:57

## 2021-09-26 RX ADMIN — CEFTRIAXONE SCH MLS/HR: 2 INJECTION, POWDER, FOR SOLUTION INTRAMUSCULAR; INTRAVENOUS at 17:38

## 2021-09-26 RX ADMIN — DEXAMETHASONE SODIUM PHOSPHATE SCH MG: 4 INJECTION, SOLUTION INTRAMUSCULAR; INTRAVENOUS at 08:49

## 2021-09-26 RX ADMIN — APIXABAN SCH MG: 5 TABLET, FILM COATED ORAL at 08:53

## 2021-09-26 RX ADMIN — BARICITINIB SCH MG: 2 TABLET, FILM COATED ORAL at 08:52

## 2021-09-26 RX ADMIN — TAMSULOSIN HYDROCHLORIDE SCH MG: 0.4 CAPSULE ORAL at 20:57

## 2021-09-26 NOTE — IPNPDOC
Text Note


Date of Service


The patient was seen on 9/26/21.





NOTE


Subjective: No new complaints overnight.  Patient has slightly increased oxygen 

requirements and now she is on 2o L with FiO2 60%


                                        


Objective:


GENERAL APPEARANCE: NAD


HEENT: no scleral icterus, plus JVD, EOMI


CARDIOVASCULAR: S1S2


LUNGS: Diminished lung sounds bilaterally


ABDOMEN: soft & not tender w palpation


MUSCULOSKELETAL: no cyanosis, +1 swelling of lower extremities


INTEGUMENT: no generalized pallor


NEUROLOGICAL: cranial nerve function from 2-12 intact, follows commands





Assessment and plan





Patient is 74 years old female with past medical history of dementia who 

presented to the hospital with increased shortness of breath and atrial 

fibrillation with rapid ventricular rate.  Patient was found to have Covid posi

tive pneumonia.





Acute hypoxemic respiratory failure/ARDS/COVID-19 pneumonia


Secondary to COVID-19 pneumonia


I changed Zosyn IV to ceftriaxone IV and doxycycline IV day 3 of antibiotic 

therapy


Procalcitonin was elevated to 3.1, improved to 0.6


Will continue remdesivir, Decadron and baricitinib


Incentive spirometry


Patient has advanced dementia and she refused to be prone.





Acute on chronic kidney failure


Secondary to volume constriction


Continue to monitor





Acute CHF


BNP significantly elevated to 4000


Torsemide 10 mg daily


I's and O's


Cardiac diet








Dementia


Follow-up with neurologist in the outpatient settings





Syncope


Most likely secondary to hypotension secondary to viral pneumonia





Chronic atrial fibrillation


Patient was not on the anticoagulation at home due to financial issue.


Heart rate under control


Continue beta-blockers


Eliquis 5 mg twice daily





DVT prophylaxis with Eliquis





VS,Fishbone, I+O


VS, Fishbone, I+O


Laboratory Tests


9/26/21 06:41








9/26/21 06:42











Vital Signs








  Date Time  Temp Pulse Resp B/P (MAP) Pulse Ox O2 Delivery O2 Flow Rate FiO2


 


9/26/21 08:00 96.5 94 20 140/80 (100) 92 HVNI-Vapotherm 20.0 60














I&O- Last 24 Hours up to 6 AM 


 


 9/26/21





 06:00


 


Intake Total 470 ml


 


Balance 470 ml

















ELINA MCADAMS DO            Sep 26, 2021 09:51

## 2021-09-27 VITALS — OXYGEN SATURATION: 94 %

## 2021-09-27 VITALS — SYSTOLIC BLOOD PRESSURE: 154 MMHG | DIASTOLIC BLOOD PRESSURE: 79 MMHG

## 2021-09-27 VITALS — OXYGEN SATURATION: 95 %

## 2021-09-27 VITALS — SYSTOLIC BLOOD PRESSURE: 148 MMHG | DIASTOLIC BLOOD PRESSURE: 72 MMHG

## 2021-09-27 VITALS — DIASTOLIC BLOOD PRESSURE: 79 MMHG | SYSTOLIC BLOOD PRESSURE: 130 MMHG

## 2021-09-27 VITALS — DIASTOLIC BLOOD PRESSURE: 73 MMHG | SYSTOLIC BLOOD PRESSURE: 155 MMHG

## 2021-09-27 LAB
BUN SERPL-MCNC: 66 MG/DL (ref 7–18)
CALCIUM SERPL-MCNC: 8.7 MG/DL (ref 8.8–10.2)
CHLORIDE SERPL-SCNC: 103 MEQ/L (ref 98–107)
CO2 SERPL-SCNC: 28 MEQ/L (ref 21–32)
CREAT SERPL-MCNC: 1.55 MG/DL (ref 0.55–1.3)
GFR SERPL CREATININE-BSD FRML MDRD: 34.4 ML/MIN/{1.73_M2} (ref 39–?)
GLUCOSE SERPL-MCNC: 184 MG/DL (ref 70–100)
HCT VFR BLD AUTO: 40.2 % (ref 36–47)
HGB BLD-MCNC: 13.5 G/DL (ref 12–15.5)
MAGNESIUM SERPL-MCNC: 1.7 MG/DL (ref 1.8–2.4)
MCH RBC QN AUTO: 29.9 PG (ref 27–33)
MCHC RBC AUTO-ENTMCNC: 33.6 G/DL (ref 32–36.5)
MCV RBC AUTO: 89.1 FL (ref 80–96)
PLATELET # BLD AUTO: 292 10^3/UL (ref 150–450)
POTASSIUM SERPL-SCNC: 3.7 MEQ/L (ref 3.5–5.1)
RBC # BLD AUTO: 4.51 10^6/UL (ref 4–5.4)
SODIUM SERPL-SCNC: 138 MEQ/L (ref 136–145)
WBC # BLD AUTO: 14.1 10^3/UL (ref 4–10)

## 2021-09-27 RX ADMIN — ESCITSLOPRAM SCH MG: 5 TABLET ORAL at 08:22

## 2021-09-27 RX ADMIN — APIXABAN SCH MG: 5 TABLET, FILM COATED ORAL at 20:05

## 2021-09-27 RX ADMIN — BARICITINIB SCH MG: 2 TABLET, FILM COATED ORAL at 08:22

## 2021-09-27 RX ADMIN — INSULIN LISPRO SCH UNITS: 100 INJECTION, SOLUTION INTRAVENOUS; SUBCUTANEOUS at 20:09

## 2021-09-27 RX ADMIN — INSULIN LISPRO SCH UNITS: 100 INJECTION, SOLUTION INTRAVENOUS; SUBCUTANEOUS at 11:54

## 2021-09-27 RX ADMIN — DEXAMETHASONE SODIUM PHOSPHATE SCH MG: 4 INJECTION, SOLUTION INTRAMUSCULAR; INTRAVENOUS at 08:22

## 2021-09-27 RX ADMIN — TAMSULOSIN HYDROCHLORIDE SCH MG: 0.4 CAPSULE ORAL at 20:05

## 2021-09-27 RX ADMIN — TORSEMIDE SCH MG: 10 TABLET ORAL at 11:12

## 2021-09-27 RX ADMIN — APIXABAN SCH MG: 5 TABLET, FILM COATED ORAL at 08:23

## 2021-09-27 RX ADMIN — CEFTRIAXONE SCH MLS/HR: 2 INJECTION, POWDER, FOR SOLUTION INTRAMUSCULAR; INTRAVENOUS at 17:10

## 2021-09-27 RX ADMIN — DEXTROSE MONOHYDRATE SCH MLS/HR: 5 INJECTION INTRAVENOUS at 06:24

## 2021-09-27 RX ADMIN — INSULIN LISPRO SCH UNITS: 100 INJECTION, SOLUTION INTRAVENOUS; SUBCUTANEOUS at 17:10

## 2021-09-27 RX ADMIN — INSULIN LISPRO SCH UNITS: 100 INJECTION, SOLUTION INTRAVENOUS; SUBCUTANEOUS at 08:23

## 2021-09-27 RX ADMIN — ASPIRIN SCH MG: 81 TABLET ORAL at 08:21

## 2021-09-27 RX ADMIN — DEXTROSE MONOHYDRATE SCH MLS/HR: 5 INJECTION INTRAVENOUS at 18:01

## 2021-09-27 RX ADMIN — MAGNESIUM OXIDE SCH MG: 400 TABLET ORAL at 09:20

## 2021-09-27 RX ADMIN — RISPERIDONE SCH MG: 0.5 TABLET ORAL at 20:06

## 2021-09-27 NOTE — IPNPDOC
Text Note


Date of Service


The patient was seen on 9/27/21.





NOTE


Subjective: No new complaints overnight.  Patient's oxygen requirements improved

today and she was on the 6 L via nasal cannula in the morning


                                        


Objective:


GENERAL APPEARANCE: NAD


HEENT: no scleral icterus, plus JVD, EOMI


CARDIOVASCULAR: S1S2


LUNGS: Diminished lung sounds bilaterally


ABDOMEN: soft & not tender w palpation


MUSCULOSKELETAL: no cyanosis, no swelling


INTEGUMENT: no generalized pallor


NEUROLOGICAL: cranial nerve function from 2-12 intact, follows commands





Assessment and plan





Patient is 74 years old female with past medical history of dementia who 

presented to the hospital with increased shortness of breath and atrial 

fibrillation with rapid ventricular rate.  Patient was found to have Covid 

positive pneumonia.





Acute hypoxemic respiratory failure/ARDS/COVID-19 pneumonia


Secondary to COVID-19 pneumonia


I changed Zosyn IV to ceftriaxone IV and doxycycline IV day 4 of antibiotic 

therapy


Procalcitonin was elevated to 3.1, improved to 0.6


Will continue remdesivir, Decadron and baricitinib


Incentive spirometry


Patient has advanced dementia and she refused to be prone.





Acute on chronic kidney failure


Secondary to volume constriction


Continue to monitor





Acute CHF


BNP significantly elevated to 4000


Torsemide 10 mg daily


I's and O's


Cardiac diet








Dementia


Follow-up with neurologist in the outpatient settings





Syncope


Most likely secondary to hypotension secondary to viral pneumonia





Chronic atrial fibrillation


Patient was not on the anticoagulation at home due to financial issue.


Heart rate under control


Continue beta-blockers


Eliquis 5 mg twice daily





DVT prophylaxis with Eliquis





VS,Fishbone, I+O


VS, Fishbone, I+O


Laboratory Tests


9/27/21 05:35











Vital Signs








  Date Time  Temp Pulse Resp B/P (MAP) Pulse Ox O2 Delivery O2 Flow Rate FiO2


 


9/27/21 08:14 97.3 73 20 130/79 (96) 95 Nasal Cannula 6.0 


 


9/26/21 16:00        50














I&O- Last 24 Hours up to 6 AM 


 


 9/27/21





 05:59


 


Intake Total 1086 ml


 


Balance 1086 ml

















ELINA MCADAMS DO            Sep 27, 2021 11:43

## 2021-09-28 VITALS — SYSTOLIC BLOOD PRESSURE: 130 MMHG | DIASTOLIC BLOOD PRESSURE: 72 MMHG

## 2021-09-28 VITALS — OXYGEN SATURATION: 98 %

## 2021-09-28 VITALS — OXYGEN SATURATION: 93 %

## 2021-09-28 VITALS — SYSTOLIC BLOOD PRESSURE: 159 MMHG | OXYGEN SATURATION: 95 % | DIASTOLIC BLOOD PRESSURE: 69 MMHG

## 2021-09-28 VITALS — SYSTOLIC BLOOD PRESSURE: 124 MMHG | DIASTOLIC BLOOD PRESSURE: 70 MMHG

## 2021-09-28 VITALS — OXYGEN SATURATION: 96 %

## 2021-09-28 VITALS — OXYGEN SATURATION: 94 %

## 2021-09-28 VITALS — OXYGEN SATURATION: 97 %

## 2021-09-28 LAB
BUN SERPL-MCNC: 60 MG/DL (ref 7–18)
CALCIUM SERPL-MCNC: 9.1 MG/DL (ref 8.8–10.2)
CHLORIDE SERPL-SCNC: 103 MEQ/L (ref 98–107)
CO2 SERPL-SCNC: 33 MEQ/L (ref 21–32)
CREAT SERPL-MCNC: 1.52 MG/DL (ref 0.55–1.3)
GFR SERPL CREATININE-BSD FRML MDRD: 35.2 ML/MIN/{1.73_M2} (ref 39–?)
GLUCOSE SERPL-MCNC: 141 MG/DL (ref 70–100)
HCT VFR BLD AUTO: 40.1 % (ref 36–47)
HGB BLD-MCNC: 13.4 G/DL (ref 12–15.5)
MAGNESIUM SERPL-MCNC: 1.9 MG/DL (ref 1.8–2.4)
MCH RBC QN AUTO: 29.9 PG (ref 27–33)
MCHC RBC AUTO-ENTMCNC: 33.4 G/DL (ref 32–36.5)
MCV RBC AUTO: 89.5 FL (ref 80–96)
PLATELET # BLD AUTO: 314 10^3/UL (ref 150–450)
POTASSIUM SERPL-SCNC: 4.2 MEQ/L (ref 3.5–5.1)
RBC # BLD AUTO: 4.48 10^6/UL (ref 4–5.4)
SODIUM SERPL-SCNC: 141 MEQ/L (ref 136–145)
WBC # BLD AUTO: 15.1 10^3/UL (ref 4–10)

## 2021-09-28 RX ADMIN — CEFTRIAXONE SCH MLS/HR: 2 INJECTION, POWDER, FOR SOLUTION INTRAMUSCULAR; INTRAVENOUS at 18:07

## 2021-09-28 RX ADMIN — DEXTROSE MONOHYDRATE SCH MLS/HR: 5 INJECTION INTRAVENOUS at 19:04

## 2021-09-28 RX ADMIN — DEXAMETHASONE SODIUM PHOSPHATE SCH MG: 4 INJECTION, SOLUTION INTRAMUSCULAR; INTRAVENOUS at 10:13

## 2021-09-28 RX ADMIN — APIXABAN SCH MG: 5 TABLET, FILM COATED ORAL at 10:16

## 2021-09-28 RX ADMIN — RISPERIDONE SCH MG: 0.5 TABLET ORAL at 20:41

## 2021-09-28 RX ADMIN — TORSEMIDE SCH MG: 10 TABLET ORAL at 10:16

## 2021-09-28 RX ADMIN — INSULIN LISPRO SCH UNITS: 100 INJECTION, SOLUTION INTRAVENOUS; SUBCUTANEOUS at 20:40

## 2021-09-28 RX ADMIN — INSULIN LISPRO SCH UNITS: 100 INJECTION, SOLUTION INTRAVENOUS; SUBCUTANEOUS at 10:14

## 2021-09-28 RX ADMIN — MAGNESIUM OXIDE SCH MG: 400 TABLET ORAL at 10:15

## 2021-09-28 RX ADMIN — BARICITINIB SCH MG: 2 TABLET, FILM COATED ORAL at 10:15

## 2021-09-28 RX ADMIN — INSULIN LISPRO SCH UNITS: 100 INJECTION, SOLUTION INTRAVENOUS; SUBCUTANEOUS at 18:07

## 2021-09-28 RX ADMIN — ESCITSLOPRAM SCH MG: 5 TABLET ORAL at 10:15

## 2021-09-28 RX ADMIN — ASPIRIN SCH MG: 81 TABLET ORAL at 10:15

## 2021-09-28 RX ADMIN — APIXABAN SCH MG: 5 TABLET, FILM COATED ORAL at 20:41

## 2021-09-28 RX ADMIN — INSULIN LISPRO SCH UNITS: 100 INJECTION, SOLUTION INTRAVENOUS; SUBCUTANEOUS at 12:56

## 2021-09-28 RX ADMIN — DEXTROSE MONOHYDRATE SCH MLS/HR: 5 INJECTION INTRAVENOUS at 07:23

## 2021-09-28 RX ADMIN — TAMSULOSIN HYDROCHLORIDE SCH MG: 0.4 CAPSULE ORAL at 20:41

## 2021-09-28 NOTE — IPNPDOC
Text Note


Date of Service


The patient was seen on 9/28/21.





NOTE


Subjective: Patient is a 76-year-old female who initially presented with shor

tness of breath secondary to COVID-19.  Patient did not have any acute events 

overnight however, she did require more oxygen overnight.  Patient is currently 

on 10 L via high flow nasal cannula.  Patient says that she is doing well but 

this is the only question she answers.





Review of systems:


Unable to obtain to the patient's dementia.











Physical exam:


Vitals: See below


General: Alert but not oriented female who was laying in bed with nasal cannula 

oxygen in place when I walked in.  Patient not appear to be in any acute 

distress.


HEENT: Normocephalic, atraumatic, moist mucous membranes.


Neck: No lymphadenopathy or thyromegaly


Cardiac: Regular rate and rhythm, no murmurs, normal S1, normal S2


Pulm: Diminished lung sounds bilaterally.


Abd: Nondistended, nontender to palpation, normal bowel sounds


Ext: No edema bilateral lower extremities





Labs: See below


Imaging: no new imaging has been performed








Assessment/plan:


76-year-old female with past medical history of dementia presented to hospital 

with increased shortness of breath and atrial fibrillation with RVR.  She was 

found to be Covid positive and have Covid positive pneumonia





1.  Acute hypoxemic respiratory failure secondary to COVID-19.  IV Zosyn was 

changed to ceftriaxone doxycycline and she is on day 4 of antibiotic therapy as 

her procalcitonin to become elevated.  Continue remdesivir, Decadron and 

baricitinib full courses.  Use incentive spirometer.  Patient has advanced 

dementia and she is unable to prone.





2.  COVID-19.  Continue treatment as above.  Continue to wean down oxygen as 

tolerated.





3.  Acute on chronic kidney failure.  Secondary to volume constriction.  

Continue to monitor.





4.  Acute congestive heart failure.  BNP was significantly elevated.  Continue 

torsemide 10 mg daily.





5.  Dementia.  Continue home medications follow-up with neurology in outpatient 

setting.





6.  Syncope.  Most likely secondary to hypotension secondary to viral pneumonia.





7.  Chronic atrial fibrillation.  Patient was on anticoagulation at home due to 

financial issues.  Heart rate under control.  Eliquis 5 mg twice daily.





DVT Prophylaxis: Eliquis twice daily





Disposition: Pending improvement in oxygenation.





VS,Fishbone, I+O


VS, Fishbone, I+O


Laboratory Tests


9/28/21 05:22











Vital Signs








  Date Time  Temp Pulse Resp B/P (MAP) Pulse Ox O2 Delivery O2 Flow Rate FiO2


 


9/28/21 06:00 97.9 80 16 130/72 (91) 93 High Flow Cannula 14.0 


 


9/26/21 16:00        50














I&O- Last 24 Hours up to 6 AM 


 


 9/28/21





 06:00


 


Intake Total 1090 ml


 


Balance 1090 ml

















ROGER CANDELARIO DO               Sep 28, 2021 12:51

## 2021-09-29 VITALS — OXYGEN SATURATION: 95 %

## 2021-09-29 VITALS — SYSTOLIC BLOOD PRESSURE: 156 MMHG | DIASTOLIC BLOOD PRESSURE: 70 MMHG

## 2021-09-29 VITALS — DIASTOLIC BLOOD PRESSURE: 74 MMHG | SYSTOLIC BLOOD PRESSURE: 163 MMHG

## 2021-09-29 VITALS — DIASTOLIC BLOOD PRESSURE: 87 MMHG | OXYGEN SATURATION: 92 % | SYSTOLIC BLOOD PRESSURE: 162 MMHG

## 2021-09-29 VITALS — OXYGEN SATURATION: 92 %

## 2021-09-29 LAB
ALBUMIN SERPL BCG-MCNC: 2.4 GM/DL (ref 3.2–5.2)
ALT SERPL W P-5'-P-CCNC: 16 U/L (ref 12–78)
APTT BLD: 32.6 SECONDS (ref 25.9–37)
BILIRUB CONJ SERPL-MCNC: 0.2 MG/DL (ref 0–0.2)
BILIRUB SERPL-MCNC: 0.4 MG/DL (ref 0.2–1)
BUN SERPL-MCNC: 56 MG/DL (ref 7–18)
CALCIUM SERPL-MCNC: 9 MG/DL (ref 8.8–10.2)
CHLORIDE SERPL-SCNC: 102 MEQ/L (ref 98–107)
CK MB CFR.DF SERPL CALC: 2.86
CK MB SERPL-MCNC: < 1 NG/ML (ref ?–3.6)
CK SERPL-CCNC: 35 U/L (ref 26–192)
CO2 SERPL-SCNC: 30 MEQ/L (ref 21–32)
CREAT SERPL-MCNC: 1.43 MG/DL (ref 0.55–1.3)
CRP SERPL-MCNC: 4.45 MG/DL (ref 0–0.3)
D DIMER PPP DDU-MCNC: 1289.97 NG/ML (ref ?–500)
FERRITIN SERPL-MCNC: 1421 NG/ML (ref 8–252)
FIBRINOGEN PPP-MCNC: 583 MG/DL (ref 268–480)
GFR SERPL CREATININE-BSD FRML MDRD: 37.8 ML/MIN/{1.73_M2} (ref 39–?)
GLUCOSE SERPL-MCNC: 181 MG/DL (ref 70–100)
HCT VFR BLD AUTO: 37.7 % (ref 36–47)
HGB BLD-MCNC: 12.5 G/DL (ref 12–15.5)
INR PPP: 1.49
LDH SERPL L TO P-CCNC: 410 U/L (ref 84–246)
MAGNESIUM SERPL-MCNC: 1.9 MG/DL (ref 1.8–2.4)
MCH RBC QN AUTO: 30 PG (ref 27–33)
MCHC RBC AUTO-ENTMCNC: 33.2 G/DL (ref 32–36.5)
MCV RBC AUTO: 90.4 FL (ref 80–96)
PLATELET # BLD AUTO: 304 10^3/UL (ref 150–450)
POTASSIUM SERPL-SCNC: 4.1 MEQ/L (ref 3.5–5.1)
PROT SERPL-MCNC: 6.1 GM/DL (ref 6.4–8.2)
PROTHROMBIN TIME: 18.5 SECONDS (ref 12.7–14.5)
RBC # BLD AUTO: 4.17 10^6/UL (ref 4–5.4)
SODIUM SERPL-SCNC: 141 MEQ/L (ref 136–145)
TROPONIN I SERPL-MCNC: < 0.02 NG/ML (ref ?–0.1)
WBC # BLD AUTO: 15.4 10^3/UL (ref 4–10)

## 2021-09-29 RX ADMIN — DEXAMETHASONE SODIUM PHOSPHATE SCH MG: 4 INJECTION, SOLUTION INTRAMUSCULAR; INTRAVENOUS at 08:48

## 2021-09-29 RX ADMIN — ACETAMINOPHEN PRN MG: 325 TABLET ORAL at 20:06

## 2021-09-29 RX ADMIN — ASPIRIN SCH MG: 81 TABLET ORAL at 08:49

## 2021-09-29 RX ADMIN — BARICITINIB SCH MG: 2 TABLET, FILM COATED ORAL at 08:50

## 2021-09-29 RX ADMIN — MAGNESIUM OXIDE SCH MG: 400 TABLET ORAL at 08:49

## 2021-09-29 RX ADMIN — ESCITSLOPRAM SCH MG: 5 TABLET ORAL at 08:49

## 2021-09-29 RX ADMIN — INSULIN LISPRO SCH UNITS: 100 INJECTION, SOLUTION INTRAVENOUS; SUBCUTANEOUS at 12:47

## 2021-09-29 RX ADMIN — INSULIN LISPRO SCH UNITS: 100 INJECTION, SOLUTION INTRAVENOUS; SUBCUTANEOUS at 20:07

## 2021-09-29 RX ADMIN — APIXABAN SCH MG: 5 TABLET, FILM COATED ORAL at 08:50

## 2021-09-29 RX ADMIN — DOXYCYCLINE HYCLATE SCH MG: 100 TABLET, COATED ORAL at 20:07

## 2021-09-29 RX ADMIN — TAMSULOSIN HYDROCHLORIDE SCH MG: 0.4 CAPSULE ORAL at 20:07

## 2021-09-29 RX ADMIN — DEXTROSE MONOHYDRATE SCH MLS/HR: 5 INJECTION INTRAVENOUS at 06:17

## 2021-09-29 RX ADMIN — INSULIN LISPRO SCH UNITS: 100 INJECTION, SOLUTION INTRAVENOUS; SUBCUTANEOUS at 17:06

## 2021-09-29 RX ADMIN — CEFDINIR SCH MG: 300 CAPSULE ORAL at 20:07

## 2021-09-29 RX ADMIN — APIXABAN SCH MG: 5 TABLET, FILM COATED ORAL at 20:07

## 2021-09-29 RX ADMIN — INSULIN LISPRO SCH UNITS: 100 INJECTION, SOLUTION INTRAVENOUS; SUBCUTANEOUS at 08:49

## 2021-09-29 RX ADMIN — TORSEMIDE SCH MG: 10 TABLET ORAL at 08:49

## 2021-09-29 RX ADMIN — RISPERIDONE SCH MG: 0.5 TABLET ORAL at 20:07

## 2021-09-29 NOTE — ECGEPIP
[facility]

                                       

                                       Test Date:    2021

Pat Name:     LISBETH BUCHANAN               Department:   

Patient ID:   [id number]                 Room:         [place] [id number]

Gender:       Female                   Technician:   belinda

:          1945               Requested By: ROGER CANDELARIO 

Order Number: [id number]     Reading MD:   Lele Dang

                                 Measurements

Intervals                              Axis          

Rate:         64                       P:            11

NV:           144                      QRS:          -35

QRSD:         84                       T:            39

QT:           426                                    

QTc:          439                                    

                           Interpretive Statements

Normal sinus rhythm

Left axis deviation

Minimal voltage criteria for LVH (R aVL).

Decreased heart rate and no longer in atrial fibrillation compared with

2021.

Electronically Signed on 2021 9:20:07 EDT by Lele Dang

## 2021-09-29 NOTE — IPNPDOC
Text Note


Date of Service


The patient was seen on 9/29/21.





NOTE


Subjective: Patient is a 76-year-old female who initially presented to the [facility] with shortness of breath was found to have COVID-19.  Patient 

decompensated in the early morning hours and needed to be placed back on 

Vapotherm.  Patient was also very bradycardic down to the 40s but recovered when

she woke up.  Patient was otherwise doing well.





Review of systems:


Unable to obtain due to patient's dementia.








Physical exam:


Vitals: See below


General: Alert but not oriented female who is laying in bed with nasal cannula 

oxygen in place and I walked in.  Patient does not appear to be in any acute 

distress.


HEENT: Normocephalic, atraumatic, moist mucous membranes.


Neck: No lymphadenopathy or thyromegaly


Cardiac: Regular rate and rhythm, no murmurs, normal S1, normal S2


Pulm: Clear to auscultation bilaterally. No wheezes, rhonchi, rales


Abd: Nondistended, nontender to palpation, normal bowel sounds


Ext: No edema bilateral lower extremities





Labs: See below


Imaging: Chest x-ray performed on 9/29/2021 is reported to show increasing 

bilateral infiltrates (left greater than right)








Assessment/plan:


76-year-old female with past medical history of dementia presented to hospital 

with increased shortness of breath and atrial fibrillation with RVR.  Patient 

was found to be Covid positive and had Covid pneumonia





1.  Acute hypoxemic respiratory failure secondary to COVID-19.  IV Zosyn changed

to ceftriaxone and doxycycline which has been changed to cefdinir and dox

ycycline today.  Patient is on day 5 of antibiotic therapy.  Continue with 

Decadron baricitinib full courses.  Use incentive spirometer as much as we can. 

Patient does have advanced dementia and is unable to prone.





2.  Bradycardia.  This was sinus bradycardia.  EKG did not show any AV blocks.  

Patient's heart rate did recover once patient woke up.  We will continue to 

monitor.  Cardiac marker panel was negative





3.  COVID-19.  Continue treatment as above.  I did speak with the patient's 

daughter this morning and informed her of the worsening of the patient's 

oxygenation.  Patient's daughter understands that at this point her prognosis 

remains bleak and was asking if she could come in to see the patient.  Patient's

daughter was recently diagnosed with COVID-19 and was recently released from 

quarantine.





4.  Acute congestive heart failure.  BNP was significantly elevated.  Continue 

torsemide.





5.  Acute on chronic kidney failure.  Secondary to volume constriction.  

Continue to monitor.





6.  Dementia.  Continue home medications.





7.  Syncope.  Most likely secondary to hypotension secondary to viral pneumonia.





8.  Chronic atrial fibrillation.  Patient was not on anticoagulation at home due

to financial issues.  Heart rate is under control patient is in sinus rhythm at 

this time.  Eliquis 5 mg twice daily





DVT Prophylaxis: Full anticoagulation with Eliquis





Disposition: Pending provement patient's oxygenation





VS,Fishbone, I+O


VS, Fishbone, I+O


Laboratory Tests


9/29/21 06:47











Vital Signs








  Date Time  Temp Pulse Resp B/P (MAP) Pulse Ox O2 Delivery O2 Flow Rate FiO2


 


9/29/21 11:43 98.6 64 19 163/74 (103) 92 HVNI-Vapotherm 35.0 90














I&O- Last 24 Hours up to 6 AM 


 


 9/29/21





 06:00


 


Intake Total 410 ml


 


Balance 410 ml

















ROGER CANDELARIO DO               Sep 29, 2021 14:52

## 2021-09-30 VITALS — SYSTOLIC BLOOD PRESSURE: 160 MMHG | DIASTOLIC BLOOD PRESSURE: 74 MMHG

## 2021-09-30 VITALS — DIASTOLIC BLOOD PRESSURE: 67 MMHG | SYSTOLIC BLOOD PRESSURE: 141 MMHG

## 2021-09-30 VITALS — DIASTOLIC BLOOD PRESSURE: 77 MMHG | SYSTOLIC BLOOD PRESSURE: 171 MMHG

## 2021-09-30 VITALS — OXYGEN SATURATION: 96 %

## 2021-09-30 VITALS — OXYGEN SATURATION: 99 %

## 2021-09-30 VITALS — DIASTOLIC BLOOD PRESSURE: 60 MMHG | SYSTOLIC BLOOD PRESSURE: 138 MMHG

## 2021-09-30 VITALS — OXYGEN SATURATION: 98 %

## 2021-09-30 LAB
BUN SERPL-MCNC: 59 MG/DL (ref 7–18)
CALCIUM SERPL-MCNC: 9 MG/DL (ref 8.8–10.2)
CHLORIDE SERPL-SCNC: 105 MEQ/L (ref 98–107)
CO2 SERPL-SCNC: 31 MEQ/L (ref 21–32)
CREAT SERPL-MCNC: 1.38 MG/DL (ref 0.55–1.3)
GFR SERPL CREATININE-BSD FRML MDRD: 39.4 ML/MIN/{1.73_M2} (ref 39–?)
GLUCOSE SERPL-MCNC: 162 MG/DL (ref 70–100)
HCT VFR BLD AUTO: 35.5 % (ref 36–47)
HGB BLD-MCNC: 11.9 G/DL (ref 12–15.5)
MAGNESIUM SERPL-MCNC: 2 MG/DL (ref 1.8–2.4)
MCH RBC QN AUTO: 30.1 PG (ref 27–33)
MCHC RBC AUTO-ENTMCNC: 33.5 G/DL (ref 32–36.5)
MCV RBC AUTO: 89.9 FL (ref 80–96)
PLATELET # BLD AUTO: 306 10^3/UL (ref 150–450)
POTASSIUM SERPL-SCNC: 4 MEQ/L (ref 3.5–5.1)
RBC # BLD AUTO: 3.95 10^6/UL (ref 4–5.4)
SODIUM SERPL-SCNC: 141 MEQ/L (ref 136–145)
WBC # BLD AUTO: 19.3 10^3/UL (ref 4–10)

## 2021-09-30 RX ADMIN — CEFDINIR SCH MG: 300 CAPSULE ORAL at 10:46

## 2021-09-30 RX ADMIN — TORSEMIDE SCH MG: 10 TABLET ORAL at 10:46

## 2021-09-30 RX ADMIN — CEFDINIR SCH MG: 300 CAPSULE ORAL at 20:15

## 2021-09-30 RX ADMIN — TAMSULOSIN HYDROCHLORIDE SCH MG: 0.4 CAPSULE ORAL at 20:15

## 2021-09-30 RX ADMIN — RISPERIDONE SCH MG: 0.5 TABLET ORAL at 20:15

## 2021-09-30 RX ADMIN — INSULIN LISPRO SCH UNITS: 100 INJECTION, SOLUTION INTRAVENOUS; SUBCUTANEOUS at 13:27

## 2021-09-30 RX ADMIN — INSULIN LISPRO SCH UNITS: 100 INJECTION, SOLUTION INTRAVENOUS; SUBCUTANEOUS at 19:48

## 2021-09-30 RX ADMIN — ESCITSLOPRAM SCH MG: 5 TABLET ORAL at 10:46

## 2021-09-30 RX ADMIN — BARICITINIB SCH MG: 2 TABLET, FILM COATED ORAL at 10:46

## 2021-09-30 RX ADMIN — MAGNESIUM OXIDE SCH MG: 400 TABLET ORAL at 10:47

## 2021-09-30 RX ADMIN — INSULIN LISPRO SCH UNITS: 100 INJECTION, SOLUTION INTRAVENOUS; SUBCUTANEOUS at 18:22

## 2021-09-30 RX ADMIN — INSULIN LISPRO SCH UNITS: 100 INJECTION, SOLUTION INTRAVENOUS; SUBCUTANEOUS at 10:45

## 2021-09-30 RX ADMIN — APIXABAN SCH MG: 5 TABLET, FILM COATED ORAL at 10:47

## 2021-09-30 RX ADMIN — APIXABAN SCH MG: 5 TABLET, FILM COATED ORAL at 20:15

## 2021-09-30 RX ADMIN — DOXYCYCLINE HYCLATE SCH MG: 100 TABLET, COATED ORAL at 20:15

## 2021-09-30 RX ADMIN — ASPIRIN SCH MG: 81 TABLET ORAL at 10:45

## 2021-09-30 RX ADMIN — DOXYCYCLINE HYCLATE SCH MG: 100 TABLET, COATED ORAL at 10:47

## 2021-09-30 NOTE — IPNPDOC
Text Note


Date of Service


The patient was seen on 9/30/21.





NOTE


Subjective: Patient is a 76-year-old female initially presented to the hospital 

shortness of breath found to have COVID-19.  Patient decompensated in the early 

morning hours yesterday need to be been placed back on Vapotherm.  Patient is 

also very bradycardic down to the 40s but recovers when she wakes up.  Patient 

is otherwise doing well





Review of systems:


Unable to obtain due to the patient's dementia.  Patient answers every question 

with "I am okay"








Physical exam:


Vitals: See below


General: Alert but not oriented female who is laying in bed with Vapotherm nasal

cannula in place when I walked in.  Patient did not appear to be in any acute 

distress.


HEENT: Normocephalic, atraumatic, moist mucous membranes.


Neck: No lymphadenopathy or thyromegaly


Cardiac: Regular rate and rhythm, no murmurs, normal S1, normal S2


Pulm: Diminished breath sounds bilaterally.


Abd: Nondistended, nontender to palpation, normal bowel sounds


Ext: No edema bilateral lower extremities





Labs: See below


Imaging: No new imaging has been performed








Assessment/plan:


76-year-old female with past medical history of dementia presented to hospital 

with increased shortness of breath and atrial fibrillation who was found to have

Covid 19





1.  Acute hypoxemic respiratory failure secondary COVID-19.  Patient is on 

ceftriaxone doxycycline, remdesivir course has been completed.  We will continue

with Decadron and baricitinib for full courses.  Patient is currently on 

Vapotherm.  I did have a conversation with the patient's daughter again today 

about the patient's prognosis.  We would need to wean the patient down to a 

lower amount of oxygen before we would be able to send her home on home O2 and 

even if we are patient is total care at this point.  Patient's daughter would 

like to come in however, infection control stated that this was not possible.  

Patient's son did recently get off Covid quarantine so he may be possible only 

able to come in to see the patient.





2.  Bradycardia.  Patient is in sinus bradycardia without any AV block.  Heart 

rate does recover once patient wakes up.  Cardiac marker panel negative.





3.  COVID-19.  Continue treatment as above.





4.  Acute congestive heart failure.  BNP was significantly elevated, patient 

does not appear to be in acute exacerbation today.  Continue torsemide.





5.  Acute on chronic kidney disease.  Secondary to volume constriction.  

Continue to monitor.  Kidney function has been stable last few days.





6.  Dementia.  Continue home medications.





7.  Syncope.  Most likely secondary to hypotension secondary to viral pneumonia.





8.  Paroxysmal atrial fibrillation.  Patient was not on anticoagulation at home 

due to financial issues.  Patient is currently in sinus rhythm.  Continue 

Eliquis 5 mg daily.





9.  Anemia.  Patient's hemoglobin is mildly decreased today.  We will continue 

to monitor this.





DVT Prophylaxis: Full anticoagulation with Eliquis





Disposition: Pending clinical improvement





VSThaddeus, I+O


VS, Thaddeus, I+O


Laboratory Tests


9/30/21 07:47











Vital Signs








  Date Time  Temp Pulse Resp B/P (MAP) Pulse Ox O2 Delivery O2 Flow Rate FiO2


 


9/30/21 12:23       12.0 


 


9/30/21 12:18     96 HVNI-Vapotherm  100


 


9/30/21 05:17 97.4 42 13 171/77 (108)    














I&O- Last 24 Hours up to 6 AM 


 


 9/30/21





 06:00


 


Intake Total 660 ml


 


Balance 660 ml

















ROGER CANDELARIO DO               Sep 30, 2021 12:41

## 2021-10-01 VITALS — SYSTOLIC BLOOD PRESSURE: 151 MMHG | DIASTOLIC BLOOD PRESSURE: 67 MMHG

## 2021-10-01 VITALS — OXYGEN SATURATION: 99 %

## 2021-10-01 VITALS — OXYGEN SATURATION: 92 %

## 2021-10-01 VITALS — DIASTOLIC BLOOD PRESSURE: 61 MMHG | SYSTOLIC BLOOD PRESSURE: 127 MMHG

## 2021-10-01 VITALS — DIASTOLIC BLOOD PRESSURE: 58 MMHG | SYSTOLIC BLOOD PRESSURE: 122 MMHG

## 2021-10-01 VITALS — OXYGEN SATURATION: 94 %

## 2021-10-01 VITALS — OXYGEN SATURATION: 91 %

## 2021-10-01 LAB
BUN SERPL-MCNC: 49 MG/DL (ref 7–18)
CALCIUM SERPL-MCNC: 8.6 MG/DL (ref 8.8–10.2)
CHLORIDE SERPL-SCNC: 100 MEQ/L (ref 98–107)
CO2 SERPL-SCNC: 31 MEQ/L (ref 21–32)
CREAT SERPL-MCNC: 1.35 MG/DL (ref 0.55–1.3)
GFR SERPL CREATININE-BSD FRML MDRD: 40.4 ML/MIN/{1.73_M2} (ref 39–?)
GLUCOSE SERPL-MCNC: 110 MG/DL (ref 70–100)
HCT VFR BLD AUTO: 35.8 % (ref 36–47)
HGB BLD-MCNC: 11.7 G/DL (ref 12–15.5)
MAGNESIUM SERPL-MCNC: 2 MG/DL (ref 1.8–2.4)
MCH RBC QN AUTO: 30 PG (ref 27–33)
MCHC RBC AUTO-ENTMCNC: 32.7 G/DL (ref 32–36.5)
MCV RBC AUTO: 91.8 FL (ref 80–96)
PLATELET # BLD AUTO: 329 10^3/UL (ref 150–450)
POTASSIUM SERPL-SCNC: 4.3 MEQ/L (ref 3.5–5.1)
RBC # BLD AUTO: 3.9 10^6/UL (ref 4–5.4)
SODIUM SERPL-SCNC: 138 MEQ/L (ref 136–145)
WBC # BLD AUTO: 17.4 10^3/UL (ref 4–10)

## 2021-10-01 RX ADMIN — APIXABAN SCH MG: 5 TABLET, FILM COATED ORAL at 20:45

## 2021-10-01 RX ADMIN — APIXABAN SCH MG: 5 TABLET, FILM COATED ORAL at 08:30

## 2021-10-01 RX ADMIN — MAGNESIUM OXIDE SCH MG: 400 TABLET ORAL at 12:50

## 2021-10-01 RX ADMIN — ESCITSLOPRAM SCH MG: 5 TABLET ORAL at 08:30

## 2021-10-01 RX ADMIN — RISPERIDONE SCH MG: 0.5 TABLET ORAL at 20:45

## 2021-10-01 RX ADMIN — TAMSULOSIN HYDROCHLORIDE SCH MG: 0.4 CAPSULE ORAL at 20:46

## 2021-10-01 RX ADMIN — INSULIN LISPRO SCH UNITS: 100 INJECTION, SOLUTION INTRAVENOUS; SUBCUTANEOUS at 17:30

## 2021-10-01 RX ADMIN — BARICITINIB SCH MG: 2 TABLET, FILM COATED ORAL at 08:35

## 2021-10-01 RX ADMIN — TORSEMIDE SCH MG: 10 TABLET ORAL at 08:30

## 2021-10-01 RX ADMIN — INSULIN LISPRO SCH UNITS: 100 INJECTION, SOLUTION INTRAVENOUS; SUBCUTANEOUS at 12:50

## 2021-10-01 RX ADMIN — CEFDINIR SCH MG: 300 CAPSULE ORAL at 20:46

## 2021-10-01 RX ADMIN — INSULIN LISPRO SCH UNITS: 100 INJECTION, SOLUTION INTRAVENOUS; SUBCUTANEOUS at 20:46

## 2021-10-01 RX ADMIN — CEFDINIR SCH MG: 300 CAPSULE ORAL at 08:30

## 2021-10-01 RX ADMIN — INSULIN LISPRO SCH UNITS: 100 INJECTION, SOLUTION INTRAVENOUS; SUBCUTANEOUS at 08:31

## 2021-10-01 RX ADMIN — DOXYCYCLINE HYCLATE SCH MG: 100 TABLET, COATED ORAL at 08:30

## 2021-10-01 RX ADMIN — DOXYCYCLINE HYCLATE SCH MG: 100 TABLET, COATED ORAL at 20:46

## 2021-10-01 RX ADMIN — ASPIRIN SCH MG: 81 TABLET ORAL at 08:30

## 2021-10-01 NOTE — IPNPDOC
Text Note


Date of Service


The patient was seen on 10/1/21.





NOTE


Subjective: Patient is a 76-year-old female initially presented to the hospital 

shortness of breath was found to have COVID-19.  Patient decompensated early 

morning hours 2 days ago immediately placed back on Vapotherm.  Patient was also

very bradycardic when she was sleeping but recovers when she wakes up.  Patient 

continues to be on Vapotherm and is only been able to wean down slightly.  

Patient appears more tired today.  No acute events overnight.





Review of systems:


Unable to obtain due to the patient's dementia











Physical exam:


Vitals: See below


General: Alert but not oriented female who is laying in bed Vapotherm nasal 

cannula in place when I walked in.  Patient did not appear to be in any acute 

distress.


HEENT: Normocephalic, atraumatic, moist mucous membranes.


Neck: No lymphadenopathy or thyromegaly


Cardiac: Regular rate and rhythm, no murmurs, normal S1, normal S2


Pulm: Diminished breath sounds bilaterally


Abd: Nondistended, nontender to palpation, normal bowel sounds


Ext: No edema bilateral lower extremities





Labs: See below


Imaging: No new imaging has been performed








Assessment/plan:


76-year-old female with past medical history of dementia presented to the 

hospital with increased shortness of breath and atrial fibrillation was found to

have COVID-19





1.  Acute hypoxemic respiratory failure secondary to COVID-19.  Patient is 

currently on cefdinir and oral doxycycline.  Remdesivir and dexamethasone has 

been completed.  Baricitinib is on day 10/14.  Patient currently on Vapotherm.  

I did have a conversation with the patient's daughter about the patient's 

prognosis today.  We discussed CODE STATUS and possible transitioning to hospice

care.  Patient's daughter and daughter-in-law were on the call and stated they 

will discuss with the rest of the family and get back to us later in the day 

although the patient's daughter did state that she did not want the patient 

intubated however, she would like to speak with her brothers before making that 

official decision.  Patient continues to be on high flow Vapotherm and appears 

more tired today.  Patient is on 20 L/min at an FiO2 of 100% and is satting in 

the low 90s.  When the patient takes her oxygen off, patient's oxygen saturation

quickly falls into the low 80s but does recover once Vapotherm is placed back in

the patient's nose.  Patient will continue with the current treatment as above.





2.  Bradycardia patient is in sinus bradycardia without any evidence for AV 

block.  Heart rate recovers when patient wakes up.





3.  COVID-19.  Continue treatment as above.





4.  Acute chest of heart failure, improved.  Continue torsemide.





5.  Acute on chronic kidney disease.  This is stabilized and will continue to 

monitor.





6.  Dementia.  Continue home medications.





7.  Syncope.  Most likely secondary to hypotension secondary to viral pneumonia.





8.  Paroxysmal atrial fibrillation.  Patient was not on anticoagulation at home 

due to financial issues.  Currently in sinus rhythm.  Continue Eliquis.





9.  Anemia.  Hemoglobin mildly decreased yesterday.  Continue to monitor.





DVT Prophylaxis: Full anticoagulation with Eliquis





Disposition: Pending clinical improvement





VS,Thaddeus, I+O


VSThaddeus, I+O


Laboratory Tests


10/1/21 05:48











Vital Signs








  Date Time  Temp Pulse Resp B/P (MAP) Pulse Ox O2 Delivery O2 Flow Rate FiO2


 


10/1/21 14:00 97.8 62 20 127/61 (83) 94 HVNI-Vapotherm 20.0 100














I&O- Last 24 Hours up to 6 AM 


 


 10/1/21





 06:00


 


Intake Total 1200 ml


 


Balance 1200 ml

















ROGER CANDELARIO DO                Oct 1, 2021 15:01

## 2021-10-02 VITALS — OXYGEN SATURATION: 93 %

## 2021-10-02 VITALS — OXYGEN SATURATION: 96 %

## 2021-10-02 VITALS — DIASTOLIC BLOOD PRESSURE: 68 MMHG | SYSTOLIC BLOOD PRESSURE: 143 MMHG

## 2021-10-02 LAB
BUN SERPL-MCNC: 40 MG/DL (ref 7–18)
CALCIUM SERPL-MCNC: 8.9 MG/DL (ref 8.8–10.2)
CHLORIDE SERPL-SCNC: 99 MEQ/L (ref 98–107)
CO2 SERPL-SCNC: 33 MEQ/L (ref 21–32)
CREAT SERPL-MCNC: 1.18 MG/DL (ref 0.55–1.3)
GFR SERPL CREATININE-BSD FRML MDRD: 47.2 ML/MIN/{1.73_M2} (ref 39–?)
GLUCOSE SERPL-MCNC: 207 MG/DL (ref 70–100)
HCT VFR BLD AUTO: 33.8 % (ref 36–47)
HGB BLD-MCNC: 11.3 G/DL (ref 12–15.5)
MAGNESIUM SERPL-MCNC: 2.1 MG/DL (ref 1.8–2.4)
MCH RBC QN AUTO: 30.1 PG (ref 27–33)
MCHC RBC AUTO-ENTMCNC: 33.4 G/DL (ref 32–36.5)
MCV RBC AUTO: 89.9 FL (ref 80–96)
PLATELET # BLD AUTO: 294 10^3/UL (ref 150–450)
POTASSIUM SERPL-SCNC: 3.8 MEQ/L (ref 3.5–5.1)
RBC # BLD AUTO: 3.76 10^6/UL (ref 4–5.4)
SODIUM SERPL-SCNC: 135 MEQ/L (ref 136–145)
WBC # BLD AUTO: 16.8 10^3/UL (ref 4–10)

## 2021-10-02 RX ADMIN — ESCITSLOPRAM SCH MG: 5 TABLET ORAL at 09:04

## 2021-10-02 RX ADMIN — TORSEMIDE SCH MG: 10 TABLET ORAL at 09:06

## 2021-10-02 RX ADMIN — INSULIN LISPRO SCH UNITS: 100 INJECTION, SOLUTION INTRAVENOUS; SUBCUTANEOUS at 17:25

## 2021-10-02 RX ADMIN — INSULIN LISPRO SCH UNITS: 100 INJECTION, SOLUTION INTRAVENOUS; SUBCUTANEOUS at 12:35

## 2021-10-02 RX ADMIN — CEFDINIR SCH MG: 300 CAPSULE ORAL at 09:05

## 2021-10-02 RX ADMIN — MAGNESIUM OXIDE SCH MG: 400 TABLET ORAL at 12:35

## 2021-10-02 RX ADMIN — INSULIN LISPRO SCH UNITS: 100 INJECTION, SOLUTION INTRAVENOUS; SUBCUTANEOUS at 09:04

## 2021-10-02 RX ADMIN — ASPIRIN SCH MG: 81 TABLET ORAL at 09:05

## 2021-10-02 RX ADMIN — APIXABAN SCH MG: 5 TABLET, FILM COATED ORAL at 09:05

## 2021-10-02 RX ADMIN — DOXYCYCLINE HYCLATE SCH MG: 100 TABLET, COATED ORAL at 09:06

## 2021-10-02 RX ADMIN — BARICITINIB SCH MG: 2 TABLET, FILM COATED ORAL at 09:05

## 2021-10-02 NOTE — IPNPDOC
Text Note


Date of Service


The patient was seen on 10/2/21.





NOTE


Subjective: Patient is a 68-year-old female initially presented to the hospital 

shortness of breath found to have COVID-19.  Patient decompensated early morning

3 days ago and had to be placed back on Vapotherm.  Patient became very agitated

and was ripping her oxygen off and trying to rip her IV out yesterday.  This 

episode lasted for about 20 minutes with the patient became too tired out.  

Patient is much calmer today but needed to have her Vapotherm increased to 40 

L/min high percent FiO2.





Review of systems:


Review of systems unable to be obtained to the patient's dementia











Physical exam:


Vitals: See below


General: Alert but not oriented female who is laying in bed with Vapotherm nasal

cannula in place when I walked in.  Patient did not appear to be in any acute 

distress.


HEENT: Normocephalic, atraumatic, moist mucous membranes.


Neck: No lymphadenopathy or thyromegaly


Cardiac: Regular rate and rhythm, no murmurs, normal S1, normal S2


Pulm: Diminished breath sounds bilaterally


Abd: Nondistended, nontender to palpation, normal bowel sounds


Ext: No edema bilateral lower extremities





Labs: See below


Imaging: No new imaging has been performed








Assessment/plan:


76-year-old female with past medical history of dementia presented to hospital 

with increased shortness of breath and atrial fibrillation found to have COVID-

19





1.  Acute hypoxemic respiratory failure secondary to COVID-19.  Patient is 

currently on cefdinir and oral doxycycline.  Remdesivir and dexamethasone been 

completed.  Baricitinib is on day 11 of 14.  Patient is currently on maxed out V

apotherm.  I did have a conversation with the patient's daughter but the 

patient's prognosis is both today and yesterday.  We did discuss CODE STATUS and

patient's daughter has decided to make the patient DNR/DNI.  Patient continues 

to be able to maintain her oxygen in the low 90s on maxed out Vapotherm.  If the

patient worsens, we will need to recontact the patient's daughter about the neck

steps in the care as I do not believe the patient will be able to tolerate CPAP 

and the patient is now a DO NOT INTUBATE.  Continue the treatment as above.





2.  Bradycardia.  Sinus bradycardia with evidence of AV block.  Heart rate 

recovers and patient wakes up.





3.  COVID-19.  Treatment as above.





4.  Congestive heart failure, improved.  Continue torsemide.





5.  Acute on chronic kidney disease.  This is stabilized we will continue to 

monitor.





6.  Dementia.  Continue home medications.





7.  Syncope.  Most likely secondary to hypotension secondary to viral pneumonia.





8.  Paroxysmal atrial fibrillation.  Not in anticoagulation at home due to 

financial issues.  Currently in sinus rhythm.  Continue Eliquis.





9.  Anemia.  Continue to monitor hemoglobin as this is stabilized.





DVT Prophylaxis: Full anticoagulation with Eliquis





Disposition: Pending clinical improvement





Late entry: I received a message from nursing staff at 15:20 stating that the 

patient's daughter and healthcare proxy wanted to speak with me.  I called the 

daughter back and she stated that she wanted to make the patient comfort 

measures only.  I explained what this would entail and the daughter is in 

agreement that the patient has suffered and has a poor prognosis of being able 

to be weaned off excision despite our best efforts in treating the patient.  

Patient had a MOLST form filled out to reflect CMO based on this conversation.  

I was told by nursing supervisor 3 days ago that if the patient become CMO, one 

visitor could come in as long as they are in full PPE and may be forced to 

quarantine by [facility].  I called the daughter back after 

conferring this to let her know that her brother Eben could come.  I instructed

nursing that we will be doing comfort measures only including removing all 

telemetry and continuous pulse oximetry.  I informed the daughter that would be 

doing this and that I have instructed the nursing staff not to fight with the 

patient about keeping her oxygen as this will only add more distress to the 

patient.  Patient's daughter is in agreement with this plan.  All questions have

been answered.  Patient has been made comfort measures only in order set has 

been placed.  Patient's been transferred to [facility] status





VS,Thaddeus, I+O


VSThaddeus, I+O


Laboratory Tests


10/2/21 06:08











Vital Signs








  Date Time  Temp Pulse Resp B/P (MAP) Pulse Ox O2 Delivery O2 Flow Rate FiO2


 


10/2/21 11:04     97 HVNI-Vapotherm 40.0 100


 


10/2/21 06:00 97.8 73 18 143/68 (93)    














I&O- Last 24 Hours up to 6 AM 


 


 10/2/21





 06:00


 


Intake Total 600 ml


 


Balance 600 ml

















ROGER CANDELARIO DO                Oct 2, 2021 13:34

## 2021-10-03 VITALS — SYSTOLIC BLOOD PRESSURE: 132 MMHG | DIASTOLIC BLOOD PRESSURE: 66 MMHG

## 2021-10-10 RX ADMIN — ACETAMINOPHEN PRN MG: 325 TABLET ORAL at 22:22

## 2021-10-11 NOTE — IPNPDOC
Text Note


Date of Service


The patient was seen on 10/11/21.





NOTE


Subjective: No new events overnight.





Objective:


GENERAL APPEARANCE: NAD


HEENT: no scleral icterus, no JVD, EOMI


CARDIOVASCULAR: S1S2


LUNGS: Diminished lung sounds bilaterally


ABDOMEN: soft & not tender w palpation


MUSCULOSKELETAL: no cyanosis, no swelling


INTEGUMENT: no generalized pallor


NEUROLOGICAL: cranial nerve function from 2-12 intact, follows commands, speech 

not dysarthric





Assessment plan


76-year-old female with past medical history of dementia presented to hospital 

with increased shortness of breath and atrial fibrillation found to have COVID-

19.  According to family wishes patient was transferred to O status.  Patient 

currently [facility]C awaiting for transfer to hospice.  Continue treatment according to

O protocol





Acute hypoxemic respiratory failure secondary to COVID-19.  


Bradycardia


COVID-19


Congestive heart failure


Acute on chronic kidney disease


Dementia


Syncope


Paroxysmal atrial fibrillation


Anemia


Palliative care encounter





VS,Fishbone, I+O


VS, Fishbone, I+O





Vital Signs








  Date Time  Temp Pulse Resp B/P (MAP) Pulse Ox O2 Delivery O2 Flow Rate FiO2


 


10/10/21 21:00       3.0 


 


10/8/21 12:31   18     


 


10/6/21 19:35        100














I&O- Last 24 Hours up to 6 AM 


 


 10/11/21





 06:00


 


Intake Total 270 ml


 


Balance 270 ml

















ELINA MCADAMS DO            Oct 11, 2021 14:47

## 2021-10-24 NOTE — DS.PDOC
Discharge Summary


General


Date of Admission


Sep 20, 2021 at 21:37


Date of Discharge


10/11/21





Discharge Summary


PROCEDURES PERFORMED DURING STAY: [None].





ADMITTING DIAGNOSES: 


Acute hypoxemic respiratory failure secondary to COVID-19


Bradycardia


COVID-19


Acute chest of heart failure


Acute on chronic kidney disease


Dementia


Syncope


Paroxysmal atrial fibrillation.  


Anemia








DISCHARGE DIAGNOSES:


Acute hypoxemic respiratory failure secondary to COVID-19


Bradycardia


COVID-19


Acute chest of heart failure


Acute on chronic kidney disease


Dementia


Syncope


Paroxysmal atrial fibrillation.  


Anemia





COMPLICATIONS/CHIEF COMPLAINT: Acute Hypoxemic Resp Failure D/T Covid-19, Arf.





HISTORY OF PRESENT ILLNESS: her history was obtained from the patients sajan coleman; the patient has dementia. At about 2PM , a 76 yr old F, fell down on 

to the ground, lost consciousness, appeared to have a seizure and lost control 

of her bowels which is not something that she typically does; she has also had a

 cough & nausea. The patient, her son whom she lives with, and her daughter 

didnt receive COVID vaccines because they have a family history of clots. The 

patients son has not had any COVID related symptoms, but her daughter was 

diagnosed with COVID 14 days ago.


 discussed the case with  who felt that the patient likely had a

 syncopal seizure rather than an epileptic event.





HOSPITAL COURSE: During the hospital stay the following issue addressed


Patient developed acute respiratory failure secondary to COVID-19.  Patient 

received course of treatment of remdesivir, dexamethasone, baricitinib and 

course of antibiotic therapy without significant clinical improvement.  Her 

oxygen requirements continued to be high.  Patient did not demonstrate any 

clinical improvement.  Patient was not able to tolerate CPAP.


 According to family wishes patient was transferred to [facility] status and she 

 around 23:45 PM from respiratory arrest





DISCHARGE MEDICATIONS: Please see below.


 


ALLERGIES: Please see below.





PHYSICAL EXAMINATION ON DISCHARGE:


VITAL SIGNS: Please see below.


GENERAL: 


HEENT: 


NECK: 


CARDIOVASCULAR EXAMINATION: 


RESPIRATORY EXAMINATION: 


ABDOMINAL EXAMINATION: 


EXTREMITIES: 


SKIN: 


NEUROLOGICAL EXAMINATION:  


PSYCHIATRIC EXAMINATION: 





LABORATORY DATA: Please see below.





IMAGING: 





PROGNOSIS: 





ACTIVITY: [As tolerated].





DIET: 





DISCHARGE PLAN: 





DISPOSITION: 20 .





DISCHARGE INSTRUCTIONS:


1. .





ITEMS TO FOLLOWUP ON ON OUTPATIENT:


1. .





DISCHARGE CONDITION: [Stable].





TIME SPENT ON DISCHARGE:  minutes.





Discharge Medications


Scheduled


Escitalopram Oxalate (Lexapro) 5 Mg Tablet, 5 MG PO DAILY, (Reported)


Lisinopril/Hydrochlorothiazide (Lisinopril-Hctz 20-25 mg Tab) 1 Each Tablet, 1 

TAB PO DAILY, (Reported)


Risperidone (Risperidone) 0.5 Mg Tablet, 0.5 MG PO QHS, (Reported)





Allergies


Coded Allergies:  


     No Known Allergies (Verified  Allergy, Unknown, 20)











ELINA MCADAMS DO            Oct 24, 2021 18:58

## 2022-05-24 NOTE — REP
INDICATION:

worsening hypoxia



COMPARISON:

09/22/2021



TECHNIQUE:

Portable AP view of the chest



FINDINGS:

Diffuse bilateral airspace disease is again appreciated and increased from prior

examination.  Specifically, there is significantly increased left lower lobe opacities

with areas of early consolidation.  No obvious effusion.  No pneumothorax.  Cardiac

silhouette is within normal limits/stable..



IMPRESSION:

Increasing bilateral infiltrates (left greater than right).





<Electronically signed by Guzman Quevedo > 09/29/21 [id number] 24-May-2022